# Patient Record
Sex: FEMALE | Race: WHITE | NOT HISPANIC OR LATINO | Employment: OTHER | URBAN - NONMETROPOLITAN AREA
[De-identification: names, ages, dates, MRNs, and addresses within clinical notes are randomized per-mention and may not be internally consistent; named-entity substitution may affect disease eponyms.]

---

## 2017-06-27 ENCOUNTER — OFFICE VISIT (OUTPATIENT)
Dept: OBSTETRICS AND GYNECOLOGY | Facility: CLINIC | Age: 72
End: 2017-06-27

## 2017-06-27 VITALS
HEART RATE: 94 BPM | BODY MASS INDEX: 34.48 KG/M2 | HEIGHT: 62 IN | RESPIRATION RATE: 16 BRPM | SYSTOLIC BLOOD PRESSURE: 120 MMHG | TEMPERATURE: 98.2 F | WEIGHT: 187.4 LBS | DIASTOLIC BLOOD PRESSURE: 80 MMHG

## 2017-06-27 DIAGNOSIS — N30.90 CYSTITIS: Primary | ICD-10-CM

## 2017-06-27 LAB
BACTERIA UR QL AUTO: ABNORMAL /HPF
BILIRUB UR QL STRIP: NEGATIVE
CLARITY UR: ABNORMAL
COLOR UR: YELLOW
GLUCOSE UR STRIP-MCNC: NEGATIVE MG/DL
HGB UR QL STRIP.AUTO: ABNORMAL
HYALINE CASTS UR QL AUTO: ABNORMAL /LPF
KETONES UR QL STRIP: NEGATIVE
LEUKOCYTE ESTERASE UR QL STRIP.AUTO: ABNORMAL
NITRITE UR QL STRIP: POSITIVE
PH UR STRIP.AUTO: 7.5 [PH] (ref 5.5–8)
PROT UR QL STRIP: ABNORMAL
RBC # UR: ABNORMAL /HPF
REF LAB TEST METHOD: ABNORMAL
SP GR UR STRIP: 1.01 (ref 1–1.03)
SQUAMOUS #/AREA URNS HPF: ABNORMAL /HPF
UROBILINOGEN UR QL STRIP: ABNORMAL
WBC CLUMPS # UR AUTO: ABNORMAL /HPF
WBC UR QL AUTO: ABNORMAL /HPF

## 2017-06-27 PROCEDURE — 87077 CULTURE AEROBIC IDENTIFY: CPT | Performed by: NURSE PRACTITIONER

## 2017-06-27 PROCEDURE — 99213 OFFICE O/P EST LOW 20 MIN: CPT | Performed by: NURSE PRACTITIONER

## 2017-06-27 PROCEDURE — 87186 SC STD MICRODIL/AGAR DIL: CPT | Performed by: NURSE PRACTITIONER

## 2017-06-27 PROCEDURE — 87086 URINE CULTURE/COLONY COUNT: CPT | Performed by: NURSE PRACTITIONER

## 2017-06-27 PROCEDURE — 81001 URINALYSIS AUTO W/SCOPE: CPT | Performed by: NURSE PRACTITIONER

## 2017-06-27 RX ORDER — SULFAMETHOXAZOLE AND TRIMETHOPRIM 800; 160 MG/1; MG/1
1 TABLET ORAL 2 TIMES DAILY
Qty: 6 TABLET | Refills: 0 | Status: CANCELLED | OUTPATIENT
Start: 2017-06-27 | End: 2017-06-30

## 2017-06-27 RX ORDER — CIPROFLOXACIN 500 MG/1
500 TABLET, FILM COATED ORAL 2 TIMES DAILY
Qty: 10 TABLET | Refills: 0 | Status: SHIPPED | OUTPATIENT
Start: 2017-06-27 | End: 2017-06-30

## 2017-06-27 NOTE — PROGRESS NOTES
Subjective   Berna Moon is a 71 y.o. female.     History of Present Illness   Pt presents with complains about dysuria and low back pain x 2 weeks. Confirms frequency and urgency, nausea without vomiting, denies fever. Took AZO Last week with some relief of dysuria. Pt hx of bladder sling and pessary. t states she has quit using the pessary because it made her have to pee more often and she noticed more incontinence with placement. She does not wish to retry and says symptoms are tolerable.     The following portions of the patient's history were reviewed and updated as appropriate: allergies, current medications, past family history, past medical history, past social history, past surgical history and problem list.    Review of Systems   Constitutional: Negative.  Negative for chills and fever.   Respiratory: Negative.    Cardiovascular: Negative.    Genitourinary: Positive for dysuria, frequency and urgency. Negative for decreased urine volume, difficulty urinating, flank pain, hematuria, vaginal discharge and vaginal pain.   Musculoskeletal: Positive for back pain (low).   Neurological: Negative for dizziness, syncope and light-headedness.       Objective   Physical Exam   Constitutional: She is oriented to person, place, and time. She appears well-developed and well-nourished.   Cardiovascular: Normal rate, regular rhythm and normal heart sounds.    Pulmonary/Chest: Effort normal and breath sounds normal.   Neurological: She is alert and oriented to person, place, and time.   Psychiatric: She has a normal mood and affect. Her behavior is normal.   Vitals reviewed.    Brief Urine Lab Results  (Last result in the past 365 days)      Color   Clarity   Blood   Leuk Est   Nitrite   Protein   CREAT   Urine HCG        06/27/17 0956 Yellow Turbid(A) Trace(A) Moderate (2+)(A) Positive(A) Trace(A)               Assessment/Plan   Berna was seen today for burning with urination and back pain.    Diagnoses and all  orders for this visit:    Cystitis  -     Urinalysis With / Culture If Indicated  -     Urinalysis, Microscopic Only  -     Urine Culture  -     ciprofloxacin (CIPRO) 500 MG tablet; Take 1 tablet by mouth 2 (Two) Times a Day for 5 days.    Discussed vulvar hygiene and bladder irritants. Pt is consuming lots of acidic foods.  Cipro BID x 5 days. AZO at home prn for bladder spasms. RTC if symptoms persist after antibiotic course. I will call with culture results and change prescription PRN.

## 2017-06-30 LAB — BACTERIA SPEC AEROBE CULT: ABNORMAL

## 2017-06-30 RX ORDER — NITROFURANTOIN 25; 75 MG/1; MG/1
100 CAPSULE ORAL 2 TIMES DAILY
Qty: 10 CAPSULE | Refills: 0 | Status: SHIPPED | OUTPATIENT
Start: 2017-06-30 | End: 2017-07-05

## 2017-07-19 ENCOUNTER — OFFICE VISIT (OUTPATIENT)
Dept: OBSTETRICS AND GYNECOLOGY | Facility: CLINIC | Age: 72
End: 2017-07-19

## 2017-07-19 VITALS
RESPIRATION RATE: 20 BRPM | DIASTOLIC BLOOD PRESSURE: 84 MMHG | BODY MASS INDEX: 34.48 KG/M2 | SYSTOLIC BLOOD PRESSURE: 128 MMHG | TEMPERATURE: 98.1 F | HEIGHT: 62 IN | HEART RATE: 88 BPM | WEIGHT: 187.4 LBS

## 2017-07-19 DIAGNOSIS — I10 ESSENTIAL HYPERTENSION: ICD-10-CM

## 2017-07-19 DIAGNOSIS — Z00.00 ROUTINE GENERAL MEDICAL EXAMINATION AT A HEALTH CARE FACILITY: Primary | ICD-10-CM

## 2017-07-19 DIAGNOSIS — R30.0 DYSURIA: Primary | ICD-10-CM

## 2017-07-19 LAB
BACTERIA UR QL AUTO: ABNORMAL /HPF
BILIRUB UR QL STRIP: NEGATIVE
CLARITY UR: CLEAR
COLOR UR: YELLOW
GLUCOSE UR STRIP-MCNC: NEGATIVE MG/DL
HGB UR QL STRIP.AUTO: ABNORMAL
HYALINE CASTS UR QL AUTO: ABNORMAL /LPF
KETONES UR QL STRIP: NEGATIVE
LEUKOCYTE ESTERASE UR QL STRIP.AUTO: NEGATIVE
NITRITE UR QL STRIP: NEGATIVE
PH UR STRIP.AUTO: 6 [PH] (ref 5.5–8)
PROT UR QL STRIP: NEGATIVE
RBC # UR: ABNORMAL /HPF
REF LAB TEST METHOD: ABNORMAL
SP GR UR STRIP: <=1.005 (ref 1–1.03)
SQUAMOUS #/AREA URNS HPF: ABNORMAL /HPF
UROBILINOGEN UR QL STRIP: ABNORMAL
WBC UR QL AUTO: ABNORMAL /HPF

## 2017-07-19 PROCEDURE — 99213 OFFICE O/P EST LOW 20 MIN: CPT | Performed by: NURSE PRACTITIONER

## 2017-07-19 PROCEDURE — 81001 URINALYSIS AUTO W/SCOPE: CPT | Performed by: NURSE PRACTITIONER

## 2017-07-19 RX ORDER — ESOMEPRAZOLE MAGNESIUM 40 MG/1
40 CAPSULE, DELAYED RELEASE ORAL
COMMUNITY
End: 2017-08-08 | Stop reason: SDUPTHER

## 2017-07-19 NOTE — PROGRESS NOTES
Subjective   Berna Moon is a 71 y.o. female.     History of Present Illness   Pt presents with concerns of mild burning with urination. I treated pt with Macrobid for e.coli on urine culture on 6/30. Pt states her urgency and frequency and diarrhea resolved and dysuria has improved some. Denies Fever, N/V.  She wants to make sure it is not an infection. She continues to consume bladder irritants including tomatoes, fruits, beverages. She notes she has tried to increase water intake.    The following portions of the patient's history were reviewed and updated as appropriate: allergies, current medications, past family history, past medical history, past social history, past surgical history and problem list.    Review of Systems   Constitutional: Negative.  Negative for chills, diaphoresis and fever.   Respiratory: Negative.    Cardiovascular: Negative.    Gastrointestinal: Negative.  Negative for diarrhea, nausea and vomiting.   Genitourinary: Positive for dysuria. Negative for decreased urine volume, difficulty urinating, flank pain, frequency, hematuria, pelvic pain, urgency and vaginal pain.       Objective   Physical Exam   Constitutional: She is oriented to person, place, and time. She appears well-developed and well-nourished.   Neurological: She is alert and oriented to person, place, and time.   Psychiatric: She has a normal mood and affect. Her behavior is normal.   Vitals reviewed.    Brief Urine Lab Results  (Last result in the past 365 days)      Color   Clarity   Blood   Leuk Est   Nitrite   Protein   CREAT   Urine HCG        07/19/17 0943 Yellow Clear Trace(A) Negative Negative Negative               Assessment/Plan   Berna was seen today for burning with urination.    Diagnoses and all orders for this visit:    Dysuria  -     Urinalysis With / Culture If Indicated  -     Urinalysis, Microscopic Only; Future  -     Urinalysis, Microscopic Only       Discussed with pt that UA is negative for  infection. With atrophy likely cause of trace RBC. She denies vaginal symptoms, exam deferred. I provided pt with handout of bladder irritants. Discussed need for 6, 8oz glasses of water daily, avoid all caffeinated beverages, reduce acidic foods like tomatoes, fruits. Sample of AZO x 1 dose provided. Take 1 tablet TID prn for dysuria up to 2 days. Consider vaginal estrogen if dysuria doesn't resolve. RTC if symptoms progress or worsen. Pt is in agreement with plan of care.

## 2017-08-01 ENCOUNTER — LAB (OUTPATIENT)
Dept: LAB | Facility: OTHER | Age: 72
End: 2017-08-01

## 2017-08-01 DIAGNOSIS — I10 ESSENTIAL HYPERTENSION: ICD-10-CM

## 2017-08-01 DIAGNOSIS — Z00.00 ROUTINE GENERAL MEDICAL EXAMINATION AT A HEALTH CARE FACILITY: ICD-10-CM

## 2017-08-01 LAB
ALBUMIN SERPL-MCNC: 3.8 G/DL (ref 3.2–5.5)
ALBUMIN/GLOB SERPL: 1.1 G/DL (ref 1–3)
ALP SERPL-CCNC: 61 U/L (ref 15–121)
ALT SERPL W P-5'-P-CCNC: 35 U/L (ref 10–60)
ANION GAP SERPL CALCULATED.3IONS-SCNC: 11 MMOL/L (ref 5–15)
AST SERPL-CCNC: 30 U/L (ref 10–60)
BASOPHILS # BLD AUTO: 0.03 10*3/MM3 (ref 0–0.2)
BASOPHILS NFR BLD AUTO: 0.3 % (ref 0–2)
BILIRUB SERPL-MCNC: 0.6 MG/DL (ref 0.2–1)
BUN BLD-MCNC: 14 MG/DL (ref 8–25)
BUN/CREAT SERPL: 20 (ref 7–25)
CALCIUM SPEC-SCNC: 9.7 MG/DL (ref 8.4–10.8)
CHLORIDE SERPL-SCNC: 102 MMOL/L (ref 100–112)
CHOLEST SERPL-MCNC: 195 MG/DL (ref 150–200)
CO2 SERPL-SCNC: 28 MMOL/L (ref 20–32)
CREAT BLD-MCNC: 0.7 MG/DL (ref 0.4–1.3)
DEPRECATED RDW RBC AUTO: 46 FL (ref 36.4–46.3)
EOSINOPHIL # BLD AUTO: 0.56 10*3/MM3 (ref 0–0.7)
EOSINOPHIL NFR BLD AUTO: 5.5 % (ref 0–7)
ERYTHROCYTE [DISTWIDTH] IN BLOOD BY AUTOMATED COUNT: 14.1 % (ref 11.5–14.5)
GFR SERPL CREATININE-BSD FRML MDRD: 83 ML/MIN/1.73 (ref 39–90)
GLOBULIN UR ELPH-MCNC: 3.4 GM/DL (ref 2.5–4.6)
GLUCOSE BLD-MCNC: 94 MG/DL (ref 70–100)
HCT VFR BLD AUTO: 40.8 % (ref 35–45)
HDLC SERPL-MCNC: 37 MG/DL (ref 35–100)
HGB BLD-MCNC: 13.4 G/DL (ref 12–15.5)
LDLC SERPL CALC-MCNC: 98 MG/DL
LDLC/HDLC SERPL: 2.64 {RATIO}
LYMPHOCYTES # BLD AUTO: 4.91 10*3/MM3 (ref 0.6–4.2)
LYMPHOCYTES NFR BLD AUTO: 48.6 % (ref 10–50)
MCH RBC QN AUTO: 30 PG (ref 26.5–34)
MCHC RBC AUTO-ENTMCNC: 32.8 G/DL (ref 31.4–36)
MCV RBC AUTO: 91.3 FL (ref 80–98)
MONOCYTES # BLD AUTO: 0.72 10*3/MM3 (ref 0–0.9)
MONOCYTES NFR BLD AUTO: 7.1 % (ref 0–12)
NEUTROPHILS # BLD AUTO: 3.88 10*3/MM3 (ref 2–8.6)
NEUTROPHILS NFR BLD AUTO: 38.5 % (ref 37–80)
PLATELET # BLD AUTO: 242 10*3/MM3 (ref 150–450)
PMV BLD AUTO: 10.4 FL (ref 8–12)
POTASSIUM BLD-SCNC: 4.8 MMOL/L (ref 3.4–5.4)
PROT SERPL-MCNC: 7.2 G/DL (ref 6.7–8.2)
RBC # BLD AUTO: 4.47 10*6/MM3 (ref 3.77–5.16)
SODIUM BLD-SCNC: 141 MMOL/L (ref 134–146)
TRIGL SERPL-MCNC: 302 MG/DL (ref 35–160)
VLDLC SERPL-MCNC: 60.4 MG/DL
WBC NRBC COR # BLD: 10.1 10*3/MM3 (ref 3.2–9.8)

## 2017-08-01 PROCEDURE — 85025 COMPLETE CBC W/AUTO DIFF WBC: CPT | Performed by: FAMILY MEDICINE

## 2017-08-01 PROCEDURE — 84439 ASSAY OF FREE THYROXINE: CPT | Performed by: FAMILY MEDICINE

## 2017-08-01 PROCEDURE — 80061 LIPID PANEL: CPT | Performed by: FAMILY MEDICINE

## 2017-08-01 PROCEDURE — 36415 COLL VENOUS BLD VENIPUNCTURE: CPT | Performed by: FAMILY MEDICINE

## 2017-08-01 PROCEDURE — 84443 ASSAY THYROID STIM HORMONE: CPT | Performed by: FAMILY MEDICINE

## 2017-08-01 PROCEDURE — 80053 COMPREHEN METABOLIC PANEL: CPT | Performed by: FAMILY MEDICINE

## 2017-08-02 LAB
T4 FREE SERPL-MCNC: 1.13 NG/DL (ref 0.78–2.19)
TSH SERPL DL<=0.05 MIU/L-ACNC: 3.24 MIU/ML (ref 0.46–4.68)

## 2017-08-08 ENCOUNTER — OFFICE VISIT (OUTPATIENT)
Dept: FAMILY MEDICINE CLINIC | Facility: CLINIC | Age: 72
End: 2017-08-08

## 2017-08-08 VITALS
BODY MASS INDEX: 34.41 KG/M2 | HEIGHT: 62 IN | SYSTOLIC BLOOD PRESSURE: 120 MMHG | DIASTOLIC BLOOD PRESSURE: 68 MMHG | WEIGHT: 187 LBS

## 2017-08-08 DIAGNOSIS — I10 ESSENTIAL HYPERTENSION: Primary | ICD-10-CM

## 2017-08-08 DIAGNOSIS — E78.1 HYPERTRIGLYCERIDEMIA: ICD-10-CM

## 2017-08-08 PROCEDURE — 99214 OFFICE O/P EST MOD 30 MIN: CPT | Performed by: FAMILY MEDICINE

## 2017-08-08 RX ORDER — LISINOPRIL 20 MG/1
20 TABLET ORAL DAILY
Qty: 90 TABLET | Refills: 1 | Status: SHIPPED | OUTPATIENT
Start: 2017-08-08 | End: 2019-05-06 | Stop reason: SDUPTHER

## 2017-08-08 RX ORDER — ESOMEPRAZOLE MAGNESIUM 40 MG/1
40 CAPSULE, DELAYED RELEASE ORAL
Qty: 90 CAPSULE | Refills: 1 | Status: SHIPPED | OUTPATIENT
Start: 2017-08-08 | End: 2019-05-06 | Stop reason: SINTOL

## 2017-08-08 NOTE — PROGRESS NOTES
Subjective   Berna Moon is a 71 y.o. female who presents to the office for follow-up on hypertension and review of labs.     History of Present Illness   Hypertension   This is a chronic problem. The current episode started more than 1 year ago. The problem has been waxing and waning since onset. Associated symptoms include anxiety, headaches and malaise/fatigue. Pertinent negatives include no blurred vision, chest pain, neck pain, orthopnea, palpitations, peripheral edema, PND, shortness of breath or sweats. There are no associated agents to hypertension. Risk factors for coronary artery disease include dyslipidemia and family history. Past treatments include ace inhibitors.  The current treatment provides moderate improvement. There are no compliance problems.      The following portions of the patient's history were reviewed and updated as appropriate: allergies, current medications, past family history, past medical history, past social history, past surgical history and problem list.    Review of Systems   Constitutional: Positive for fatigue.   HENT: Negative.    Respiratory: Negative.  Negative for shortness of breath.    Cardiovascular: Negative.  Negative for chest pain.   Gastrointestinal: Negative.    Musculoskeletal: Positive for arthralgias. Negative for myalgias.   Skin: Negative.    Allergic/Immunologic: Negative for immunocompromised state.   Neurological: Negative for dizziness, tremors, seizures, syncope, weakness and numbness.   Hematological: Negative.    Psychiatric/Behavioral: Negative for agitation, confusion, dysphoric mood and sleep disturbance. The patient is not nervous/anxious.        Objective   Physical Exam   Constitutional: She is oriented to person, place, and time. She appears well-developed and well-nourished.   HENT:   Head: Normocephalic and atraumatic.   Nose: Nose normal.   Mouth/Throat: Oropharynx is clear and moist.   Eyes: Conjunctivae and EOM are normal. Pupils are  equal, round, and reactive to light.   Neck: Normal range of motion. Neck supple. No JVD present. No tracheal deviation present. No thyromegaly present.   Cardiovascular: Normal rate, regular rhythm, normal heart sounds and intact distal pulses.    No murmur heard.  Pulmonary/Chest: Effort normal and breath sounds normal. She has no wheezes.   Abdominal: Soft. Bowel sounds are normal. She exhibits no distension. There is no tenderness.   Musculoskeletal: Normal range of motion. She exhibits no edema.   Lymphadenopathy:     She has no cervical adenopathy.   Neurological: She is alert and oriented to person, place, and time. Coordination normal.   Skin: Skin is warm and dry. No rash noted.   Psychiatric: She has a normal mood and affect.   Nursing note and vitals reviewed.      Assessment/Plan   Berna was seen today for results and med refill.    Diagnoses and all orders for this visit:    Essential hypertension    Hypertriglyceridemia    Other orders  -     esomeprazole (nexIUM) 40 MG capsule; Take 1 capsule by mouth Every Morning Before Breakfast.  -     lisinopril (PRINIVIL,ZESTRIL) 20 MG tablet; Take 1 tablet by mouth Daily.  Review labs as below.  We'll continue with lisinopril attention to check in 6 months.    Triglycerides remain elevated.  She's not been able to tolerate medications for this.  Discussed specific diet changes and will recheck in 6 months     Labs are reviewed with patient.      Lab on 08/01/2017   Component Date Value Ref Range Status   • Free T4 08/01/2017 1.13  0.78 - 2.19 ng/dL Final   • TSH 08/01/2017 3.240  0.460 - 4.680 mIU/mL Final   • Glucose 08/01/2017 94  70 - 100 mg/dL Final   • BUN 08/01/2017 14  8 - 25 mg/dL Final   • Creatinine 08/01/2017 0.70  0.40 - 1.30 mg/dL Final   • Sodium 08/01/2017 141  134 - 146 mmol/L Final   • Potassium 08/01/2017 4.8  3.4 - 5.4 mmol/L Final   • Chloride 08/01/2017 102  100 - 112 mmol/L Final   • CO2 08/01/2017 28.0  20.0 - 32.0 mmol/L Final   •  Calcium 08/01/2017 9.7  8.4 - 10.8 mg/dL Final   • Total Protein 08/01/2017 7.2  6.7 - 8.2 g/dL Final   • Albumin 08/01/2017 3.80  3.20 - 5.50 g/dL Final   • ALT (SGPT) 08/01/2017 35  10 - 60 U/L Final   • AST (SGOT) 08/01/2017 30  10 - 60 U/L Final   • Alkaline Phosphatase 08/01/2017 61  15 - 121 U/L Final   • Total Bilirubin 08/01/2017 0.6  0.2 - 1.0 mg/dL Final   • eGFR Non African Amer 08/01/2017 83  39 - 90 mL/min/1.73 Final   • Globulin 08/01/2017 3.4  2.5 - 4.6 gm/dL Final   • A/G Ratio 08/01/2017 1.1  1.0 - 3.0 g/dL Final   • BUN/Creatinine Ratio 08/01/2017 20.0  7.0 - 25.0 Final   • Anion Gap 08/01/2017 11.0  5.0 - 15.0 mmol/L Final   • Total Cholesterol 08/01/2017 195  150 - 200 mg/dL Final   • Triglycerides 08/01/2017 302* 35 - 160 mg/dL Final   • HDL Cholesterol 08/01/2017 37  35 - 100 mg/dL Final   • LDL Cholesterol  08/01/2017 98  mg/dL Final   • VLDL Cholesterol 08/01/2017 60.4  mg/dL Final   • LDL/HDL Ratio 08/01/2017 2.64   Final   • WBC 08/01/2017 10.10* 3.20 - 9.80 10*3/mm3 Final   • RBC 08/01/2017 4.47  3.77 - 5.16 10*6/mm3 Final   • Hemoglobin 08/01/2017 13.4  12.0 - 15.5 g/dL Final   • Hematocrit 08/01/2017 40.8  35.0 - 45.0 % Final   • MCV 08/01/2017 91.3  80.0 - 98.0 fL Final   • MCH 08/01/2017 30.0  26.5 - 34.0 pg Final   • MCHC 08/01/2017 32.8  31.4 - 36.0 g/dL Final   • RDW 08/01/2017 14.1  11.5 - 14.5 % Final   • RDW-SD 08/01/2017 46.0  36.4 - 46.3 fl Final   • MPV 08/01/2017 10.4  8.0 - 12.0 fL Final   • Platelets 08/01/2017 242  150 - 450 10*3/mm3 Final   • Neutrophil % 08/01/2017 38.5  37.0 - 80.0 % Final   • Lymphocyte % 08/01/2017 48.6  10.0 - 50.0 % Final   • Monocyte % 08/01/2017 7.1  0.0 - 12.0 % Final   • Eosinophil % 08/01/2017 5.5  0.0 - 7.0 % Final   • Basophil % 08/01/2017 0.3  0.0 - 2.0 % Final   • Neutrophils, Absolute 08/01/2017 3.88  2.00 - 8.60 10*3/mm3 Final   • Lymphocytes, Absolute 08/01/2017 4.91* 0.60 - 4.20 10*3/mm3 Final   • Monocytes, Absolute 08/01/2017 0.72   0.00 - 0.90 10*3/mm3 Final   • Eosinophils, Absolute 08/01/2017 0.56  0.00 - 0.70 10*3/mm3 Final   • Basophils, Absolute 08/01/2017 0.03  0.00 - 0.20 10*3/mm3 Final   Office Visit on 07/19/2017   Component Date Value Ref Range Status   • Color, UA 07/19/2017 Yellow  Yellow, Straw Final   • Appearance, UA 07/19/2017 Clear  Clear Final   • pH, UA 07/19/2017 6.0  5.5 - 8.0 Final   • Specific Gravity, UA 07/19/2017 <=1.005  1.005 - 1.030 Final   • Glucose, UA 07/19/2017 Negative  Negative Final   • Ketones, UA 07/19/2017 Negative  Negative Final   • Bilirubin, UA 07/19/2017 Negative  Negative Final   • Blood, UA 07/19/2017 Trace* Negative Final   • Protein, UA 07/19/2017 Negative  Negative Final   • Leuk Esterase, UA 07/19/2017 Negative  Negative Final   • Nitrite, UA 07/19/2017 Negative  Negative Final   • Urobilinogen, UA 07/19/2017 0.2 E.U./dL  0.2 - 1.0 E.U./dL Final   • RBC, UA 07/19/2017 0-2* None Seen /HPF Final   • WBC, UA 07/19/2017 0-2* None Seen /HPF Final   • Bacteria, UA 07/19/2017 Trace* None Seen /HPF Final   • Squamous Epithelial Cells, UA 07/19/2017 0-2  None Seen, 0-2 /HPF Final   • Hyaline Casts, UA 07/19/2017 None Seen  None Seen /LPF Final   • Methodology 07/19/2017 Manual Light Microscopy   Final   Office Visit on 06/27/2017   Component Date Value Ref Range Status   • Color, UA 06/27/2017 Yellow  Yellow, Straw Final   • Appearance, UA 06/27/2017 Turbid* Clear Final   • pH, UA 06/27/2017 7.5  5.5 - 8.0 Final   • Specific Gravity, UA 06/27/2017 1.015  1.005 - 1.030 Final   • Glucose, UA 06/27/2017 Negative  Negative Final   • Ketones, UA 06/27/2017 Negative  Negative Final   • Bilirubin, UA 06/27/2017 Negative  Negative Final   • Blood, UA 06/27/2017 Trace* Negative Final   • Protein, UA 06/27/2017 Trace* Negative Final   • Leuk Esterase, UA 06/27/2017 Moderate (2+)* Negative Final   • Nitrite, UA 06/27/2017 Positive* Negative Final   • Urobilinogen, UA 06/27/2017 0.2 E.U./dL  0.2 - 1.0 E.U./dL Final    • RBC, UA 06/27/2017 3-5* None Seen /HPF Final   • WBC, UA 06/27/2017 Too Numerous to Count* None Seen /HPF Final   • Bacteria, UA 06/27/2017 3+* None Seen /HPF Final   • Squamous Epithelial Cells, UA 06/27/2017 3-6* None Seen, 0-2 /HPF Final   • Hyaline Casts, UA 06/27/2017 None Seen  None Seen /LPF Final   • WBC Clumps, UA 06/27/2017 Moderate/2+  None Seen /HPF Final   • Methodology 06/27/2017 Manual Light Microscopy   Final   • Urine Culture 06/27/2017 >100,000 CFU/mL Escherichia coli*  Final   ]

## 2018-10-18 ENCOUNTER — CLINICAL SUPPORT (OUTPATIENT)
Dept: FAMILY MEDICINE CLINIC | Facility: CLINIC | Age: 73
End: 2018-10-18

## 2018-10-18 DIAGNOSIS — Z23 NEED FOR VACCINATION: Primary | ICD-10-CM

## 2018-10-18 PROCEDURE — 90662 IIV NO PRSV INCREASED AG IM: CPT | Performed by: FAMILY MEDICINE

## 2018-10-18 PROCEDURE — G0008 ADMIN INFLUENZA VIRUS VAC: HCPCS | Performed by: FAMILY MEDICINE

## 2019-04-29 DIAGNOSIS — E78.1 HYPERTRIGLYCERIDEMIA: ICD-10-CM

## 2019-04-29 DIAGNOSIS — I10 ESSENTIAL HYPERTENSION: Primary | ICD-10-CM

## 2019-04-30 ENCOUNTER — LAB (OUTPATIENT)
Dept: LAB | Facility: OTHER | Age: 74
End: 2019-04-30

## 2019-04-30 DIAGNOSIS — I10 ESSENTIAL HYPERTENSION: ICD-10-CM

## 2019-04-30 DIAGNOSIS — E78.1 HYPERTRIGLYCERIDEMIA: ICD-10-CM

## 2019-04-30 LAB
ALBUMIN SERPL-MCNC: 4.1 G/DL (ref 3.5–5)
ALBUMIN/GLOB SERPL: 1.2 G/DL (ref 1.1–1.8)
ALP SERPL-CCNC: 69 U/L (ref 38–126)
ALT SERPL W P-5'-P-CCNC: 38 U/L
ANION GAP SERPL CALCULATED.3IONS-SCNC: 8 MMOL/L (ref 5–15)
AST SERPL-CCNC: 38 U/L (ref 14–36)
BASOPHILS # BLD AUTO: 0.04 10*3/MM3 (ref 0–0.2)
BASOPHILS NFR BLD AUTO: 0.4 % (ref 0–1.5)
BILIRUB SERPL-MCNC: 0.4 MG/DL (ref 0.2–1.3)
BUN BLD-MCNC: 11 MG/DL (ref 7–23)
BUN/CREAT SERPL: 16.4 (ref 7–25)
CALCIUM SPEC-SCNC: 9.8 MG/DL (ref 8.4–10.2)
CHLORIDE SERPL-SCNC: 102 MMOL/L (ref 101–112)
CHOLEST SERPL-MCNC: 207 MG/DL (ref 150–200)
CO2 SERPL-SCNC: 31 MMOL/L (ref 22–30)
CREAT BLD-MCNC: 0.67 MG/DL (ref 0.52–1.04)
DEPRECATED RDW RBC AUTO: 47 FL (ref 37–54)
EOSINOPHIL # BLD AUTO: 0.4 10*3/MM3 (ref 0–0.4)
EOSINOPHIL NFR BLD AUTO: 4.5 % (ref 0.3–6.2)
ERYTHROCYTE [DISTWIDTH] IN BLOOD BY AUTOMATED COUNT: 14.3 % (ref 12.3–15.4)
GFR SERPL CREATININE-BSD FRML MDRD: 87 ML/MIN/1.73 (ref 39–90)
GLOBULIN UR ELPH-MCNC: 3.5 GM/DL (ref 2.3–3.5)
GLUCOSE BLD-MCNC: 96 MG/DL (ref 70–99)
HCT VFR BLD AUTO: 41.7 % (ref 34–46.6)
HDLC SERPL-MCNC: 42 MG/DL (ref 40–59)
HGB BLD-MCNC: 13.8 G/DL (ref 12–15.9)
LDLC SERPL CALC-MCNC: 124 MG/DL
LDLC/HDLC SERPL: 2.96 {RATIO} (ref 0–3.22)
LYMPHOCYTES # BLD AUTO: 3.88 10*3/MM3 (ref 0.7–3.1)
LYMPHOCYTES NFR BLD AUTO: 43.4 % (ref 19.6–45.3)
MCH RBC QN AUTO: 30.5 PG (ref 26.6–33)
MCHC RBC AUTO-ENTMCNC: 33.1 G/DL (ref 31.5–35.7)
MCV RBC AUTO: 92.3 FL (ref 79–97)
MONOCYTES # BLD AUTO: 0.77 10*3/MM3 (ref 0.1–0.9)
MONOCYTES NFR BLD AUTO: 8.6 % (ref 5–12)
NEUTROPHILS # BLD AUTO: 3.86 10*3/MM3 (ref 1.7–7)
NEUTROPHILS NFR BLD AUTO: 43.1 % (ref 42.7–76)
PLATELET # BLD AUTO: 242 10*3/MM3 (ref 140–450)
PMV BLD AUTO: 10.3 FL (ref 6–12)
POTASSIUM BLD-SCNC: 4.6 MMOL/L (ref 3.4–5)
PROT SERPL-MCNC: 7.6 G/DL (ref 6.3–8.6)
RBC # BLD AUTO: 4.52 10*6/MM3 (ref 3.77–5.28)
SODIUM BLD-SCNC: 141 MMOL/L (ref 137–145)
T4 FREE SERPL-MCNC: 1.12 NG/DL (ref 0.93–1.7)
TRIGL SERPL-MCNC: 204 MG/DL
TSH SERPL DL<=0.05 MIU/L-ACNC: 3.22 MIU/ML (ref 0.27–4.2)
VLDLC SERPL-MCNC: 40.8 MG/DL
WBC NRBC COR # BLD: 8.95 10*3/MM3 (ref 3.4–10.8)

## 2019-04-30 PROCEDURE — 85025 COMPLETE CBC W/AUTO DIFF WBC: CPT | Performed by: FAMILY MEDICINE

## 2019-04-30 PROCEDURE — 84439 ASSAY OF FREE THYROXINE: CPT | Performed by: FAMILY MEDICINE

## 2019-04-30 PROCEDURE — 84443 ASSAY THYROID STIM HORMONE: CPT | Performed by: FAMILY MEDICINE

## 2019-04-30 PROCEDURE — 80061 LIPID PANEL: CPT | Performed by: FAMILY MEDICINE

## 2019-04-30 PROCEDURE — 36415 COLL VENOUS BLD VENIPUNCTURE: CPT | Performed by: FAMILY MEDICINE

## 2019-04-30 PROCEDURE — 80053 COMPREHEN METABOLIC PANEL: CPT | Performed by: FAMILY MEDICINE

## 2019-05-06 ENCOUNTER — OFFICE VISIT (OUTPATIENT)
Dept: FAMILY MEDICINE CLINIC | Facility: CLINIC | Age: 74
End: 2019-05-06

## 2019-05-06 VITALS
SYSTOLIC BLOOD PRESSURE: 124 MMHG | DIASTOLIC BLOOD PRESSURE: 88 MMHG | WEIGHT: 190 LBS | BODY MASS INDEX: 37.3 KG/M2 | HEIGHT: 60 IN

## 2019-05-06 DIAGNOSIS — E66.9 OBESITY (BMI 30-39.9): ICD-10-CM

## 2019-05-06 DIAGNOSIS — I10 ESSENTIAL HYPERTENSION: Primary | ICD-10-CM

## 2019-05-06 DIAGNOSIS — E78.5 HYPERLIPIDEMIA, UNSPECIFIED HYPERLIPIDEMIA TYPE: ICD-10-CM

## 2019-05-06 PROCEDURE — 99214 OFFICE O/P EST MOD 30 MIN: CPT | Performed by: FAMILY MEDICINE

## 2019-05-06 RX ORDER — LISINOPRIL 20 MG/1
20 TABLET ORAL DAILY
Qty: 90 TABLET | Refills: 1 | Status: SHIPPED | OUTPATIENT
Start: 2019-05-06 | End: 2019-10-31 | Stop reason: SDUPTHER

## 2019-05-06 NOTE — PROGRESS NOTES
Subjective   Berna Moon is a 73 y.o. female who presents to the office for follow-up on hypertension and review of labs.  She has gained some weight and is asking for some tips to help her lose.  She admits that she eats a lot of ice cream and sweets.  Reviewed her labs today.  She has tried all of the statins even at a low dose and had severe intolerable muscle aches.    History of Present Illness   Hypertension   This is a chronic problem. The current episode started more than 1 year ago. The problem has been waxing and waning since onset. Associated symptoms include anxiety, headaches and malaise/fatigue. Pertinent negatives include no blurred vision, chest pain, neck pain, orthopnea, palpitations, peripheral edema, PND, shortness of breath or sweats. There are no associated agents to hypertension. Risk factors for coronary artery disease include dyslipidemia and family history. Past treatments include ace inhibitors.  The current treatment provides moderate improvement. There are no compliance problems.      The following portions of the patient's history were reviewed and updated as appropriate: allergies, current medications, past family history, past medical history, past social history, past surgical history and problem list.    Review of Systems   Constitutional: Positive for fatigue.   HENT: Negative.    Respiratory: Negative.  Negative for shortness of breath.    Cardiovascular: Negative.  Negative for chest pain.   Gastrointestinal: Negative.    Musculoskeletal: Positive for arthralgias. Negative for myalgias.   Skin: Negative.    Allergic/Immunologic: Negative for immunocompromised state.   Neurological: Negative for dizziness, tremors, seizures, syncope, weakness and numbness.   Hematological: Negative.    Psychiatric/Behavioral: Negative for agitation, confusion, dysphoric mood and sleep disturbance. The patient is not nervous/anxious.        Objective   Physical Exam   Constitutional: She is  oriented to person, place, and time. She appears well-developed and well-nourished.   HENT:   Head: Normocephalic and atraumatic.   Nose: Nose normal.   Mouth/Throat: Oropharynx is clear and moist.   Eyes: Conjunctivae and EOM are normal. Pupils are equal, round, and reactive to light.   Neck: Normal range of motion. Neck supple. No JVD present. No tracheal deviation present. No thyromegaly present.   Cardiovascular: Normal rate, regular rhythm, normal heart sounds and intact distal pulses.   No murmur heard.  Pulmonary/Chest: Effort normal and breath sounds normal. She has no wheezes.   Abdominal: Soft. Bowel sounds are normal. She exhibits no distension. There is no tenderness.   Musculoskeletal: Normal range of motion. She exhibits no edema.   Lymphadenopathy:     She has no cervical adenopathy.   Neurological: She is alert and oriented to person, place, and time. Coordination normal.   Skin: Skin is warm and dry. No rash noted.   Psychiatric: She has a normal mood and affect.   Nursing note and vitals reviewed.      Assessment/Plan   Berna was seen today for follow-up.    Diagnoses and all orders for this visit:    Essential hypertension    Hyperlipidemia, unspecified hyperlipidemia type    Obesity (BMI 30-39.9)    Other orders  -     lisinopril (PRINIVIL,ZESTRIL) 20 MG tablet; Take 1 tablet by mouth Daily.    Review labs as below.  We'll continue with lisinopril attention to check in 6 months.  Gave her a printout on some diet changes specifically for low-cholesterol diet.  With weight loss and dietary compliance hopefully we can bring her cholesterol down as she does not tolerate the statins but will follow-up in 6 months and if not improving consider other treatment forms.     Labs are reviewed with patient.      Lab on 04/30/2019   Component Date Value Ref Range Status   • Free T4 04/30/2019 1.12  0.93 - 1.70 ng/dL Final   • TSH 04/30/2019 3.220  0.270 - 4.200 mIU/mL Final   • Glucose 04/30/2019 96  70 - 99  mg/dL Final   • BUN 04/30/2019 11  7 - 23 mg/dL Final   • Creatinine 04/30/2019 0.67  0.52 - 1.04 mg/dL Final   • Sodium 04/30/2019 141  137 - 145 mmol/L Final   • Potassium 04/30/2019 4.6  3.4 - 5.0 mmol/L Final   • Chloride 04/30/2019 102  101 - 112 mmol/L Final   • CO2 04/30/2019 31.0* 22.0 - 30.0 mmol/L Final   • Calcium 04/30/2019 9.8  8.4 - 10.2 mg/dL Final   • Total Protein 04/30/2019 7.6  6.3 - 8.6 g/dL Final   • Albumin 04/30/2019 4.10  3.50 - 5.00 g/dL Final   • ALT (SGPT) 04/30/2019 38* <=35 U/L Final   • AST (SGOT) 04/30/2019 38* 14 - 36 U/L Final   • Alkaline Phosphatase 04/30/2019 69  38 - 126 U/L Final   • Total Bilirubin 04/30/2019 0.4  0.2 - 1.3 mg/dL Final   • eGFR Non African Amer 04/30/2019 87  39 - 90 mL/min/1.73 Final   • Globulin 04/30/2019 3.5  2.3 - 3.5 gm/dL Final   • A/G Ratio 04/30/2019 1.2  1.1 - 1.8 g/dL Final   • BUN/Creatinine Ratio 04/30/2019 16.4  7.0 - 25.0 Final   • Anion Gap 04/30/2019 8.0  5.0 - 15.0 mmol/L Final   • Total Cholesterol 04/30/2019 207* 150 - 200 mg/dL Final   • Triglycerides 04/30/2019 204* <=150 mg/dL Final   • HDL Cholesterol 04/30/2019 42  40 - 59 mg/dL Final   • LDL Cholesterol  04/30/2019 124* <=100 mg/dL Final   • VLDL Cholesterol 04/30/2019 40.8  mg/dL Final   • LDL/HDL Ratio 04/30/2019 2.96  0.00 - 3.22 Final   • WBC 04/30/2019 8.95  3.40 - 10.80 10*3/mm3 Final   • RBC 04/30/2019 4.52  3.77 - 5.28 10*6/mm3 Final   • Hemoglobin 04/30/2019 13.8  12.0 - 15.9 g/dL Final   • Hematocrit 04/30/2019 41.7  34.0 - 46.6 % Final   • MCV 04/30/2019 92.3  79.0 - 97.0 fL Final   • MCH 04/30/2019 30.5  26.6 - 33.0 pg Final   • MCHC 04/30/2019 33.1  31.5 - 35.7 g/dL Final   • RDW 04/30/2019 14.3  12.3 - 15.4 % Final   • RDW-SD 04/30/2019 47.0  37.0 - 54.0 fl Final   • MPV 04/30/2019 10.3  6.0 - 12.0 fL Final   • Platelets 04/30/2019 242  140 - 450 10*3/mm3 Final   • Neutrophil % 04/30/2019 43.1  42.7 - 76.0 % Final   • Lymphocyte % 04/30/2019 43.4  19.6 - 45.3 % Final   •  Monocyte % 04/30/2019 8.6  5.0 - 12.0 % Final   • Eosinophil % 04/30/2019 4.5  0.3 - 6.2 % Final   • Basophil % 04/30/2019 0.4  0.0 - 1.5 % Final   • Neutrophils, Absolute 04/30/2019 3.86  1.70 - 7.00 10*3/mm3 Final   • Lymphocytes, Absolute 04/30/2019 3.88* 0.70 - 3.10 10*3/mm3 Final   • Monocytes, Absolute 04/30/2019 0.77  0.10 - 0.90 10*3/mm3 Final   • Eosinophils, Absolute 04/30/2019 0.40  0.00 - 0.40 10*3/mm3 Final   • Basophils, Absolute 04/30/2019 0.04  0.00 - 0.20 10*3/mm3 Final   ]

## 2019-10-11 ENCOUNTER — CLINICAL SUPPORT (OUTPATIENT)
Dept: FAMILY MEDICINE CLINIC | Facility: CLINIC | Age: 74
End: 2019-10-11

## 2019-10-11 DIAGNOSIS — Z23 NEED FOR INFLUENZA VACCINATION: Primary | ICD-10-CM

## 2019-10-11 PROCEDURE — 90653 IIV ADJUVANT VACCINE IM: CPT | Performed by: INTERNAL MEDICINE

## 2019-10-11 PROCEDURE — 90471 IMMUNIZATION ADMIN: CPT | Performed by: INTERNAL MEDICINE

## 2019-10-29 ENCOUNTER — LAB (OUTPATIENT)
Dept: LAB | Facility: OTHER | Age: 74
End: 2019-10-29

## 2019-10-29 DIAGNOSIS — E78.5 HYPERLIPIDEMIA, UNSPECIFIED HYPERLIPIDEMIA TYPE: ICD-10-CM

## 2019-10-29 DIAGNOSIS — I10 ESSENTIAL HYPERTENSION: ICD-10-CM

## 2019-10-29 DIAGNOSIS — I10 ESSENTIAL HYPERTENSION: Primary | ICD-10-CM

## 2019-10-29 LAB
ALBUMIN SERPL-MCNC: 4.4 G/DL (ref 3.5–5)
ALBUMIN/GLOB SERPL: 1.3 G/DL (ref 1.1–1.8)
ALP SERPL-CCNC: 70 U/L (ref 38–126)
ALT SERPL W P-5'-P-CCNC: 40 U/L
ANION GAP SERPL CALCULATED.3IONS-SCNC: 10 MMOL/L (ref 5–15)
AST SERPL-CCNC: 35 U/L (ref 14–36)
BASOPHILS # BLD AUTO: 0.02 10*3/MM3 (ref 0–0.2)
BASOPHILS NFR BLD AUTO: 0.2 % (ref 0–1.5)
BILIRUB SERPL-MCNC: 0.5 MG/DL (ref 0.2–1.3)
BUN BLD-MCNC: 11 MG/DL (ref 7–23)
BUN/CREAT SERPL: 15.7 (ref 7–25)
CALCIUM SPEC-SCNC: 9.8 MG/DL (ref 8.4–10.2)
CHLORIDE SERPL-SCNC: 103 MMOL/L (ref 101–112)
CHOLEST SERPL-MCNC: 191 MG/DL (ref 150–200)
CO2 SERPL-SCNC: 30 MMOL/L (ref 22–30)
CREAT BLD-MCNC: 0.7 MG/DL (ref 0.52–1.04)
DEPRECATED RDW RBC AUTO: 45.4 FL (ref 37–54)
EOSINOPHIL # BLD AUTO: 0.38 10*3/MM3 (ref 0–0.4)
EOSINOPHIL NFR BLD AUTO: 4.4 % (ref 0.3–6.2)
ERYTHROCYTE [DISTWIDTH] IN BLOOD BY AUTOMATED COUNT: 14 % (ref 12.3–15.4)
GFR SERPL CREATININE-BSD FRML MDRD: 82 ML/MIN/1.73 (ref 39–90)
GLOBULIN UR ELPH-MCNC: 3.3 GM/DL (ref 2.3–3.5)
GLUCOSE BLD-MCNC: 100 MG/DL (ref 70–99)
HCT VFR BLD AUTO: 42.8 % (ref 34–46.6)
HDLC SERPL-MCNC: 39 MG/DL (ref 40–59)
HGB BLD-MCNC: 14.1 G/DL (ref 12–15.9)
LDLC SERPL CALC-MCNC: 106 MG/DL
LDLC/HDLC SERPL: 2.72 {RATIO} (ref 0–3.22)
LYMPHOCYTES # BLD AUTO: 3.39 10*3/MM3 (ref 0.7–3.1)
LYMPHOCYTES NFR BLD AUTO: 39.6 % (ref 19.6–45.3)
MCH RBC QN AUTO: 29.8 PG (ref 26.6–33)
MCHC RBC AUTO-ENTMCNC: 32.9 G/DL (ref 31.5–35.7)
MCV RBC AUTO: 90.5 FL (ref 79–97)
MONOCYTES # BLD AUTO: 0.63 10*3/MM3 (ref 0.1–0.9)
MONOCYTES NFR BLD AUTO: 7.4 % (ref 5–12)
NEUTROPHILS # BLD AUTO: 4.14 10*3/MM3 (ref 1.7–7)
NEUTROPHILS NFR BLD AUTO: 48.4 % (ref 42.7–76)
PLATELET # BLD AUTO: 258 10*3/MM3 (ref 140–450)
PMV BLD AUTO: 10.5 FL (ref 6–12)
POTASSIUM BLD-SCNC: 4.4 MMOL/L (ref 3.4–5)
PROT SERPL-MCNC: 7.7 G/DL (ref 6.3–8.6)
RBC # BLD AUTO: 4.73 10*6/MM3 (ref 3.77–5.28)
SODIUM BLD-SCNC: 143 MMOL/L (ref 137–145)
TRIGL SERPL-MCNC: 229 MG/DL
VLDLC SERPL-MCNC: 45.8 MG/DL
WBC NRBC COR # BLD: 8.56 10*3/MM3 (ref 3.4–10.8)

## 2019-10-29 PROCEDURE — 36415 COLL VENOUS BLD VENIPUNCTURE: CPT | Performed by: FAMILY MEDICINE

## 2019-10-29 PROCEDURE — 80061 LIPID PANEL: CPT | Performed by: FAMILY MEDICINE

## 2019-10-29 PROCEDURE — 85025 COMPLETE CBC W/AUTO DIFF WBC: CPT | Performed by: FAMILY MEDICINE

## 2019-10-29 PROCEDURE — 80053 COMPREHEN METABOLIC PANEL: CPT | Performed by: FAMILY MEDICINE

## 2019-10-29 PROCEDURE — 84443 ASSAY THYROID STIM HORMONE: CPT | Performed by: FAMILY MEDICINE

## 2019-10-29 PROCEDURE — 84439 ASSAY OF FREE THYROXINE: CPT | Performed by: FAMILY MEDICINE

## 2019-10-30 LAB
T4 FREE SERPL-MCNC: 1.16 NG/DL (ref 0.93–1.7)
TSH SERPL DL<=0.05 MIU/L-ACNC: 3.42 UIU/ML (ref 0.27–4.2)

## 2019-11-01 RX ORDER — LISINOPRIL 20 MG/1
TABLET ORAL
Qty: 90 TABLET | Refills: 1 | Status: SHIPPED | OUTPATIENT
Start: 2019-11-01 | End: 2019-11-06 | Stop reason: SDUPTHER

## 2019-11-06 ENCOUNTER — OFFICE VISIT (OUTPATIENT)
Dept: FAMILY MEDICINE CLINIC | Facility: CLINIC | Age: 74
End: 2019-11-06

## 2019-11-06 VITALS
SYSTOLIC BLOOD PRESSURE: 110 MMHG | DIASTOLIC BLOOD PRESSURE: 76 MMHG | HEIGHT: 60 IN | BODY MASS INDEX: 37.69 KG/M2 | WEIGHT: 192 LBS

## 2019-11-06 DIAGNOSIS — Z12.31 ENCOUNTER FOR SCREENING MAMMOGRAM FOR BREAST CANCER: Primary | ICD-10-CM

## 2019-11-06 DIAGNOSIS — E78.5 HYPERLIPIDEMIA, UNSPECIFIED HYPERLIPIDEMIA TYPE: ICD-10-CM

## 2019-11-06 DIAGNOSIS — Z78.0 POST-MENOPAUSAL: ICD-10-CM

## 2019-11-06 DIAGNOSIS — J30.2 SEASONAL ALLERGIES: ICD-10-CM

## 2019-11-06 DIAGNOSIS — E66.01 MORBIDLY OBESE (HCC): ICD-10-CM

## 2019-11-06 PROCEDURE — 99214 OFFICE O/P EST MOD 30 MIN: CPT | Performed by: FAMILY MEDICINE

## 2019-11-06 RX ORDER — LORATADINE 10 MG/1
10 TABLET ORAL DAILY
Qty: 30 TABLET | Refills: 5 | Status: SHIPPED | OUTPATIENT
Start: 2019-11-06 | End: 2021-07-20

## 2019-11-06 RX ORDER — LISINOPRIL 20 MG/1
20 TABLET ORAL DAILY
Qty: 90 TABLET | Refills: 1 | Status: SHIPPED | OUTPATIENT
Start: 2019-11-06 | End: 2020-06-05

## 2019-11-06 NOTE — PROGRESS NOTES
"  Subjective   Berna Moon is a 74 y.o. female with hypertension, hyperlipidemia who presents to the office for follow-up and review of labs.    She is due for mammogram and bone density.  Blood pressure has been fairly well controlled on her current medicine.    She feels like she has \"in her ear.\"  She is having some head fullness and congestion.  No fever or rhinorrhea.    History of Present Illness   Hypertension   This is a chronic problem. The current episode started more than 1 year ago. The problem has been waxing and waning since onset. Associated symptoms include anxiety, headaches and malaise/fatigue. Pertinent negatives include no blurred vision, chest pain, neck pain, orthopnea, palpitations, peripheral edema, PND, shortness of breath or sweats. There are no associated agents to hypertension. Risk factors for coronary artery disease include dyslipidemia and family history. Past treatments include ace inhibitors.  The current treatment provides moderate improvement. There are no compliance problems.      The following portions of the patient's history were reviewed and updated as appropriate: allergies, current medications, past family history, past medical history, past social history, past surgical history and problem list.    Review of Systems   Constitutional: Positive for fatigue.   HENT: Negative.    Respiratory: Negative.  Negative for shortness of breath.    Cardiovascular: Negative.  Negative for chest pain.   Gastrointestinal: Negative.    Musculoskeletal: Positive for arthralgias. Negative for myalgias.   Skin: Negative.    Allergic/Immunologic: Negative for immunocompromised state.   Neurological: Negative for dizziness, tremors, seizures, syncope, weakness and numbness.   Hematological: Negative.    Psychiatric/Behavioral: Negative for agitation, confusion, dysphoric mood and sleep disturbance. The patient is not nervous/anxious.        Objective      Vitals:    11/06/19 1007   BP: " "110/76   Weight: 87.1 kg (192 lb)   Height: 152.4 cm (60\")   PainSc: 0-No pain     BMI 37.5    Physical Exam   Constitutional: She is oriented to person, place, and time. She appears well-developed and well-nourished.   HENT:   Head: Normocephalic and atraumatic.   Nose: Nose normal.   Mouth/Throat: Oropharynx is clear and moist.   Eyes: Conjunctivae and EOM are normal. Pupils are equal, round, and reactive to light.   Neck: Normal range of motion. Neck supple. No JVD present. No tracheal deviation present. No thyromegaly present.   Cardiovascular: Normal rate, regular rhythm, normal heart sounds and intact distal pulses.   No murmur heard.  Pulmonary/Chest: Effort normal and breath sounds normal. She has no wheezes.   Abdominal: Soft. Bowel sounds are normal. She exhibits no distension. There is no tenderness.   Musculoskeletal: Normal range of motion. She exhibits no edema.   Lymphadenopathy:     She has no cervical adenopathy.   Neurological: She is alert and oriented to person, place, and time. Coordination normal.   Skin: Skin is warm and dry. No rash noted.   Psychiatric: She has a normal mood and affect.   Nursing note and vitals reviewed.    Lab on 10/29/2019   Component Date Value Ref Range Status   • Free T4 10/29/2019 1.16  0.93 - 1.70 ng/dL Final   • TSH 10/29/2019 3.420  0.270 - 4.200 uIU/mL Final   • Glucose 10/29/2019 100* 70 - 99 mg/dL Final   • BUN 10/29/2019 11  7 - 23 mg/dL Final   • Creatinine 10/29/2019 0.70  0.52 - 1.04 mg/dL Final   • Sodium 10/29/2019 143  137 - 145 mmol/L Final   • Potassium 10/29/2019 4.4  3.4 - 5.0 mmol/L Final   • Chloride 10/29/2019 103  101 - 112 mmol/L Final   • CO2 10/29/2019 30.0  22.0 - 30.0 mmol/L Final   • Calcium 10/29/2019 9.8  8.4 - 10.2 mg/dL Final   • Total Protein 10/29/2019 7.7  6.3 - 8.6 g/dL Final   • Albumin 10/29/2019 4.40  3.50 - 5.00 g/dL Final   • ALT (SGPT) 10/29/2019 40* <=35 U/L Final   • AST (SGOT) 10/29/2019 35  14 - 36 U/L Final   • Alkaline " Phosphatase 10/29/2019 70  38 - 126 U/L Final   • Total Bilirubin 10/29/2019 0.5  0.2 - 1.3 mg/dL Final   • eGFR Non African Amer 10/29/2019 82  39 - 90 mL/min/1.73 Final   • Globulin 10/29/2019 3.3  2.3 - 3.5 gm/dL Final   • A/G Ratio 10/29/2019 1.3  1.1 - 1.8 g/dL Final   • BUN/Creatinine Ratio 10/29/2019 15.7  7.0 - 25.0 Final   • Anion Gap 10/29/2019 10.0  5.0 - 15.0 mmol/L Final   • Total Cholesterol 10/29/2019 191  150 - 200 mg/dL Final   • Triglycerides 10/29/2019 229* <=150 mg/dL Final   • HDL Cholesterol 10/29/2019 39* 40 - 59 mg/dL Final   • LDL Cholesterol  10/29/2019 106* <=100 mg/dL Final   • VLDL Cholesterol 10/29/2019 45.8  mg/dL Final   • LDL/HDL Ratio 10/29/2019 2.72  0.00 - 3.22 Final   • WBC 10/29/2019 8.56  3.40 - 10.80 10*3/mm3 Final   • RBC 10/29/2019 4.73  3.77 - 5.28 10*6/mm3 Final   • Hemoglobin 10/29/2019 14.1  12.0 - 15.9 g/dL Final   • Hematocrit 10/29/2019 42.8  34.0 - 46.6 % Final   • MCV 10/29/2019 90.5  79.0 - 97.0 fL Final   • MCH 10/29/2019 29.8  26.6 - 33.0 pg Final   • MCHC 10/29/2019 32.9  31.5 - 35.7 g/dL Final   • RDW 10/29/2019 14.0  12.3 - 15.4 % Final   • RDW-SD 10/29/2019 45.4  37.0 - 54.0 fl Final   • MPV 10/29/2019 10.5  6.0 - 12.0 fL Final   • Platelets 10/29/2019 258  140 - 450 10*3/mm3 Final   • Neutrophil % 10/29/2019 48.4  42.7 - 76.0 % Final   • Lymphocyte % 10/29/2019 39.6  19.6 - 45.3 % Final   • Monocyte % 10/29/2019 7.4  5.0 - 12.0 % Final   • Eosinophil % 10/29/2019 4.4  0.3 - 6.2 % Final   • Basophil % 10/29/2019 0.2  0.0 - 1.5 % Final   • Neutrophils, Absolute 10/29/2019 4.14  1.70 - 7.00 10*3/mm3 Final   • Lymphocytes, Absolute 10/29/2019 3.39* 0.70 - 3.10 10*3/mm3 Final   • Monocytes, Absolute 10/29/2019 0.63  0.10 - 0.90 10*3/mm3 Final   • Eosinophils, Absolute 10/29/2019 0.38  0.00 - 0.40 10*3/mm3 Final   • Basophils, Absolute 10/29/2019 0.02  0.00 - 0.20 10*3/mm3 Final     Assessment/Plan   Berna was seen today for follow-up.    Diagnoses and all orders  for this visit:    Encounter for screening mammogram for breast cancer  -     Mammo Screening Bilateral With CAD; Future    Morbidly obese (CMS/HCC)    Post-menopausal  -     DEXA Bone Density Axial; Future    Hyperlipidemia, unspecified hyperlipidemia type    Seasonal allergies    Other orders  -     lisinopril (PRINIVIL,ZESTRIL) 20 MG tablet; Take 1 tablet by mouth Daily.  -     loratadine (CLARITIN) 10 MG tablet; Take 1 tablet by mouth Daily.    Review labs as below.  We'll continue with lisinopril for hypertension.  She has not been able to tolerate the statins.  Discussed diet changes, weight loss to help with lipids and recheck in 6 months.      Suspect seasonal allergies are triggering some of the ear fullness.  Recommend Claritin, Flonase and see me if not improving within 2 weeks or for worsening symptoms.    Labs are reviewed with patient.    Discussed the patient's BMI with her. BMI is above normal parameters. Follow-up plan includes:  educational material and nutrition counseling.  Needs mammogram, bone density    Lab on 10/29/2019   Component Date Value Ref Range Status   • Free T4 10/29/2019 1.16  0.93 - 1.70 ng/dL Final   • TSH 10/29/2019 3.420  0.270 - 4.200 uIU/mL Final   • Glucose 10/29/2019 100* 70 - 99 mg/dL Final   • BUN 10/29/2019 11  7 - 23 mg/dL Final   • Creatinine 10/29/2019 0.70  0.52 - 1.04 mg/dL Final   • Sodium 10/29/2019 143  137 - 145 mmol/L Final   • Potassium 10/29/2019 4.4  3.4 - 5.0 mmol/L Final   • Chloride 10/29/2019 103  101 - 112 mmol/L Final   • CO2 10/29/2019 30.0  22.0 - 30.0 mmol/L Final   • Calcium 10/29/2019 9.8  8.4 - 10.2 mg/dL Final   • Total Protein 10/29/2019 7.7  6.3 - 8.6 g/dL Final   • Albumin 10/29/2019 4.40  3.50 - 5.00 g/dL Final   • ALT (SGPT) 10/29/2019 40* <=35 U/L Final   • AST (SGOT) 10/29/2019 35  14 - 36 U/L Final   • Alkaline Phosphatase 10/29/2019 70  38 - 126 U/L Final   • Total Bilirubin 10/29/2019 0.5  0.2 - 1.3 mg/dL Final   • eGFR Non African  Amer 10/29/2019 82  39 - 90 mL/min/1.73 Final   • Globulin 10/29/2019 3.3  2.3 - 3.5 gm/dL Final   • A/G Ratio 10/29/2019 1.3  1.1 - 1.8 g/dL Final   • BUN/Creatinine Ratio 10/29/2019 15.7  7.0 - 25.0 Final   • Anion Gap 10/29/2019 10.0  5.0 - 15.0 mmol/L Final   • Total Cholesterol 10/29/2019 191  150 - 200 mg/dL Final   • Triglycerides 10/29/2019 229* <=150 mg/dL Final   • HDL Cholesterol 10/29/2019 39* 40 - 59 mg/dL Final   • LDL Cholesterol  10/29/2019 106* <=100 mg/dL Final   • VLDL Cholesterol 10/29/2019 45.8  mg/dL Final   • LDL/HDL Ratio 10/29/2019 2.72  0.00 - 3.22 Final   • WBC 10/29/2019 8.56  3.40 - 10.80 10*3/mm3 Final   • RBC 10/29/2019 4.73  3.77 - 5.28 10*6/mm3 Final   • Hemoglobin 10/29/2019 14.1  12.0 - 15.9 g/dL Final   • Hematocrit 10/29/2019 42.8  34.0 - 46.6 % Final   • MCV 10/29/2019 90.5  79.0 - 97.0 fL Final   • MCH 10/29/2019 29.8  26.6 - 33.0 pg Final   • MCHC 10/29/2019 32.9  31.5 - 35.7 g/dL Final   • RDW 10/29/2019 14.0  12.3 - 15.4 % Final   • RDW-SD 10/29/2019 45.4  37.0 - 54.0 fl Final   • MPV 10/29/2019 10.5  6.0 - 12.0 fL Final   • Platelets 10/29/2019 258  140 - 450 10*3/mm3 Final   • Neutrophil % 10/29/2019 48.4  42.7 - 76.0 % Final   • Lymphocyte % 10/29/2019 39.6  19.6 - 45.3 % Final   • Monocyte % 10/29/2019 7.4  5.0 - 12.0 % Final   • Eosinophil % 10/29/2019 4.4  0.3 - 6.2 % Final   • Basophil % 10/29/2019 0.2  0.0 - 1.5 % Final   • Neutrophils, Absolute 10/29/2019 4.14  1.70 - 7.00 10*3/mm3 Final   • Lymphocytes, Absolute 10/29/2019 3.39* 0.70 - 3.10 10*3/mm3 Final   • Monocytes, Absolute 10/29/2019 0.63  0.10 - 0.90 10*3/mm3 Final   • Eosinophils, Absolute 10/29/2019 0.38  0.00 - 0.40 10*3/mm3 Final   • Basophils, Absolute 10/29/2019 0.02  0.00 - 0.20 10*3/mm3 Final   ]

## 2019-12-11 RX ORDER — ESOMEPRAZOLE MAGNESIUM 40 MG/1
40 CAPSULE, DELAYED RELEASE ORAL
Qty: 90 CAPSULE | Refills: 2 | Status: SHIPPED | OUTPATIENT
Start: 2019-12-11 | End: 2020-10-08

## 2020-06-05 RX ORDER — LISINOPRIL 20 MG/1
TABLET ORAL
Qty: 90 TABLET | Refills: 1 | Status: SHIPPED | OUTPATIENT
Start: 2020-06-05 | End: 2020-12-23

## 2020-10-07 ENCOUNTER — CLINICAL SUPPORT (OUTPATIENT)
Dept: FAMILY MEDICINE CLINIC | Facility: CLINIC | Age: 75
End: 2020-10-07

## 2020-10-07 DIAGNOSIS — Z23 NEED FOR VACCINATION: Primary | ICD-10-CM

## 2020-10-07 PROCEDURE — 90694 VACC AIIV4 NO PRSRV 0.5ML IM: CPT | Performed by: FAMILY MEDICINE

## 2020-10-07 PROCEDURE — G0008 ADMIN INFLUENZA VIRUS VAC: HCPCS | Performed by: FAMILY MEDICINE

## 2020-10-08 RX ORDER — ESOMEPRAZOLE MAGNESIUM 40 MG/1
CAPSULE, DELAYED RELEASE ORAL
Qty: 90 CAPSULE | Refills: 0 | Status: SHIPPED | OUTPATIENT
Start: 2020-10-08 | End: 2020-12-23

## 2020-12-23 RX ORDER — ESOMEPRAZOLE MAGNESIUM 40 MG/1
CAPSULE, DELAYED RELEASE ORAL
Qty: 90 CAPSULE | Refills: 0 | Status: SHIPPED | OUTPATIENT
Start: 2020-12-23 | End: 2021-04-01

## 2020-12-23 RX ORDER — LISINOPRIL 20 MG/1
TABLET ORAL
Qty: 90 TABLET | Refills: 1 | Status: SHIPPED | OUTPATIENT
Start: 2020-12-23 | End: 2021-07-14

## 2021-04-01 RX ORDER — ESOMEPRAZOLE MAGNESIUM 40 MG/1
CAPSULE, DELAYED RELEASE ORAL
Qty: 90 CAPSULE | Refills: 0 | Status: SHIPPED | OUTPATIENT
Start: 2021-04-01 | End: 2021-07-14

## 2021-07-13 ENCOUNTER — LAB (OUTPATIENT)
Dept: LAB | Facility: OTHER | Age: 76
End: 2021-07-13

## 2021-07-13 DIAGNOSIS — I10 BENIGN ESSENTIAL HTN: Primary | ICD-10-CM

## 2021-07-13 DIAGNOSIS — I10 BENIGN ESSENTIAL HTN: ICD-10-CM

## 2021-07-13 LAB
ALBUMIN SERPL-MCNC: 4.3 G/DL (ref 3.5–5)
ALBUMIN/GLOB SERPL: 1.2 G/DL (ref 1.1–1.8)
ALP SERPL-CCNC: 80 U/L (ref 38–126)
ALT SERPL W P-5'-P-CCNC: 27 U/L
ANION GAP SERPL CALCULATED.3IONS-SCNC: 7 MMOL/L (ref 5–15)
AST SERPL-CCNC: 35 U/L (ref 14–36)
BASOPHILS # BLD AUTO: 0.05 10*3/MM3 (ref 0–0.2)
BASOPHILS NFR BLD AUTO: 0.6 % (ref 0–1.5)
BILIRUB SERPL-MCNC: 0.4 MG/DL (ref 0.2–1.3)
BUN SERPL-MCNC: 10 MG/DL (ref 7–23)
BUN/CREAT SERPL: 14.7 (ref 7–25)
CALCIUM SPEC-SCNC: 9.8 MG/DL (ref 8.4–10.2)
CHLORIDE SERPL-SCNC: 104 MMOL/L (ref 101–112)
CHOLEST SERPL-MCNC: 217 MG/DL (ref 150–200)
CO2 SERPL-SCNC: 30 MMOL/L (ref 22–30)
CREAT SERPL-MCNC: 0.68 MG/DL (ref 0.52–1.04)
DEPRECATED RDW RBC AUTO: 45.1 FL (ref 37–54)
EOSINOPHIL # BLD AUTO: 0.34 10*3/MM3 (ref 0–0.4)
EOSINOPHIL NFR BLD AUTO: 4.3 % (ref 0.3–6.2)
ERYTHROCYTE [DISTWIDTH] IN BLOOD BY AUTOMATED COUNT: 13.9 % (ref 12.3–15.4)
GFR SERPL CREATININE-BSD FRML MDRD: 85 ML/MIN/1.73 (ref 39–90)
GLOBULIN UR ELPH-MCNC: 3.5 GM/DL (ref 2.3–3.5)
GLUCOSE SERPL-MCNC: 98 MG/DL (ref 70–99)
HCT VFR BLD AUTO: 41.8 % (ref 34–46.6)
HDLC SERPL-MCNC: 47 MG/DL (ref 40–59)
HGB BLD-MCNC: 14 G/DL (ref 12–15.9)
LDLC SERPL CALC-MCNC: 117 MG/DL
LDLC/HDLC SERPL: 2.31 {RATIO} (ref 0–3.22)
LYMPHOCYTES # BLD AUTO: 3.62 10*3/MM3 (ref 0.7–3.1)
LYMPHOCYTES NFR BLD AUTO: 45.5 % (ref 19.6–45.3)
MCH RBC QN AUTO: 30.3 PG (ref 26.6–33)
MCHC RBC AUTO-ENTMCNC: 33.5 G/DL (ref 31.5–35.7)
MCV RBC AUTO: 90.5 FL (ref 79–97)
MONOCYTES # BLD AUTO: 0.6 10*3/MM3 (ref 0.1–0.9)
MONOCYTES NFR BLD AUTO: 7.5 % (ref 5–12)
NEUTROPHILS NFR BLD AUTO: 3.35 10*3/MM3 (ref 1.7–7)
NEUTROPHILS NFR BLD AUTO: 42.1 % (ref 42.7–76)
PLATELET # BLD AUTO: 247 10*3/MM3 (ref 140–450)
PMV BLD AUTO: 10.8 FL (ref 6–12)
POTASSIUM SERPL-SCNC: 4.6 MMOL/L (ref 3.4–5)
PROT SERPL-MCNC: 7.8 G/DL (ref 6.3–8.6)
RBC # BLD AUTO: 4.62 10*6/MM3 (ref 3.77–5.28)
SODIUM SERPL-SCNC: 141 MMOL/L (ref 137–145)
TRIGL SERPL-MCNC: 307 MG/DL
VLDLC SERPL-MCNC: 53 MG/DL (ref 5–40)
WBC # BLD AUTO: 7.96 10*3/MM3 (ref 3.4–10.8)

## 2021-07-13 PROCEDURE — 36415 COLL VENOUS BLD VENIPUNCTURE: CPT | Performed by: FAMILY MEDICINE

## 2021-07-13 PROCEDURE — 84439 ASSAY OF FREE THYROXINE: CPT | Performed by: FAMILY MEDICINE

## 2021-07-13 PROCEDURE — 80053 COMPREHEN METABOLIC PANEL: CPT | Performed by: FAMILY MEDICINE

## 2021-07-13 PROCEDURE — 84443 ASSAY THYROID STIM HORMONE: CPT | Performed by: FAMILY MEDICINE

## 2021-07-13 PROCEDURE — 80061 LIPID PANEL: CPT | Performed by: FAMILY MEDICINE

## 2021-07-13 PROCEDURE — 85025 COMPLETE CBC W/AUTO DIFF WBC: CPT | Performed by: FAMILY MEDICINE

## 2021-07-14 LAB
T4 FREE SERPL-MCNC: 1.2 NG/DL (ref 0.93–1.7)
TSH SERPL DL<=0.05 MIU/L-ACNC: 3.2 UIU/ML (ref 0.27–4.2)

## 2021-07-14 RX ORDER — LISINOPRIL 20 MG/1
TABLET ORAL
Qty: 90 TABLET | Refills: 1 | Status: SHIPPED | OUTPATIENT
Start: 2021-07-14 | End: 2021-07-20 | Stop reason: SDUPTHER

## 2021-07-14 RX ORDER — ESOMEPRAZOLE MAGNESIUM 40 MG/1
CAPSULE, DELAYED RELEASE ORAL
Qty: 90 CAPSULE | Refills: 0 | Status: SHIPPED | OUTPATIENT
Start: 2021-07-14 | End: 2021-07-20 | Stop reason: SDUPTHER

## 2021-07-20 ENCOUNTER — OFFICE VISIT (OUTPATIENT)
Dept: FAMILY MEDICINE CLINIC | Facility: CLINIC | Age: 76
End: 2021-07-20

## 2021-07-20 VITALS — BODY MASS INDEX: 33.68 KG/M2 | WEIGHT: 183 LBS | HEIGHT: 62 IN

## 2021-07-20 DIAGNOSIS — I10 ESSENTIAL HYPERTENSION: Primary | ICD-10-CM

## 2021-07-20 DIAGNOSIS — M15.9 PRIMARY OSTEOARTHRITIS INVOLVING MULTIPLE JOINTS: ICD-10-CM

## 2021-07-20 DIAGNOSIS — E78.5 HYPERLIPIDEMIA, UNSPECIFIED HYPERLIPIDEMIA TYPE: ICD-10-CM

## 2021-07-20 DIAGNOSIS — K21.9 GASTROESOPHAGEAL REFLUX DISEASE WITHOUT ESOPHAGITIS: ICD-10-CM

## 2021-07-20 PROCEDURE — 99214 OFFICE O/P EST MOD 30 MIN: CPT | Performed by: FAMILY MEDICINE

## 2021-07-20 RX ORDER — ESOMEPRAZOLE MAGNESIUM 40 MG/1
40 CAPSULE, DELAYED RELEASE ORAL
Qty: 90 CAPSULE | Refills: 3 | Status: SHIPPED | OUTPATIENT
Start: 2021-07-20 | End: 2022-07-13 | Stop reason: SDUPTHER

## 2021-07-20 RX ORDER — LISINOPRIL 20 MG/1
20 TABLET ORAL DAILY
Qty: 90 TABLET | Refills: 3 | Status: SHIPPED | OUTPATIENT
Start: 2021-07-20 | End: 2022-07-13 | Stop reason: SDUPTHER

## 2021-07-20 RX ORDER — COLESEVELAM HYDROCHLORIDE 3.75 G/1
3.75 POWDER, FOR SUSPENSION ORAL DAILY
Qty: 30 EACH | Refills: 5 | Status: SHIPPED | OUTPATIENT
Start: 2021-07-20 | End: 2022-07-13 | Stop reason: SDUPTHER

## 2021-07-20 RX ORDER — MELOXICAM 7.5 MG/1
7.5 TABLET ORAL DAILY
Qty: 30 TABLET | Refills: 5 | Status: SHIPPED | OUTPATIENT
Start: 2021-07-20 | End: 2022-07-13

## 2021-07-20 NOTE — PROGRESS NOTES
Subjective   Berna Moon is a 75 y.o. female with hypertension, hyperlipidemia who presents to the office for follow-up and review of labs.  She is having some increasing arthritis pain in the legs and arms recently.  Had recent labs to review.  Lipids remain elevated.  She has not been able to tolerate any statins in the past.  Needs refills of meds for hypertension, reflux    Has a lot of joint pain and wonders if she is a candidate for a prescription arthritis medicine    History of Present Illness   Hypertension   This is a chronic problem. The current episode started more than 1 year ago. The problem has been waxing and waning since onset. Associated symptoms include anxiety, headaches and malaise/fatigue. Pertinent negatives include no blurred vision, chest pain, neck pain, orthopnea, palpitations, peripheral edema, PND, shortness of breath or sweats. There are no associated agents to hypertension. Risk factors for coronary artery disease include dyslipidemia and family history. Past treatments include ace inhibitors.  The current treatment provides moderate improvement. There are no compliance problems.      The following portions of the patient's history were reviewed and updated as appropriate: allergies, current medications, past family history, past medical history, past social history, past surgical history and problem list.    Review of Systems   Constitutional: Positive for fatigue.   HENT: Negative.    Respiratory: Negative.  Negative for shortness of breath.    Cardiovascular: Negative.  Negative for chest pain.   Gastrointestinal: Negative.    Musculoskeletal: Positive for arthralgias. Negative for myalgias.   Skin: Negative.    Allergic/Immunologic: Negative for immunocompromised state.   Neurological: Negative for dizziness, tremors, seizures, syncope, weakness and numbness.   Hematological: Negative.    Psychiatric/Behavioral: Negative for agitation, confusion, dysphoric mood and sleep  "disturbance. The patient is not nervous/anxious.        Objective      Vitals:    07/20/21 0900   Weight: 83 kg (183 lb)   Height: 156.2 cm (61.5\")   PainSc:   8   PainLoc: Leg     Body mass index is 34.02 kg/m².      Physical Exam   Constitutional: She is oriented to person, place, and time. She appears well-developed.   HENT:   Head: Normocephalic and atraumatic.   Nose: Nose normal.   Eyes: Pupils are equal, round, and reactive to light. Conjunctivae are normal.   Neck: No JVD present. No tracheal deviation present. No thyromegaly present.   Cardiovascular: Normal rate, regular rhythm and normal heart sounds.   No murmur heard.  Pulmonary/Chest: Effort normal and breath sounds normal. She has no wheezes.   Abdominal: Soft. Bowel sounds are normal. She exhibits no distension. There is no abdominal tenderness.   Musculoskeletal: Normal range of motion.   Lymphadenopathy:     She has no cervical adenopathy.   Neurological: She is alert and oriented to person, place, and time. Coordination normal.   Skin: Skin is warm and dry. No rash noted.   Nursing note and vitals reviewed.    Lab on 07/13/2021   Component Date Value Ref Range Status   • Glucose 07/13/2021 98  70 - 99 mg/dL Final   • BUN 07/13/2021 10  7 - 23 mg/dL Final   • Creatinine 07/13/2021 0.68  0.52 - 1.04 mg/dL Final   • Sodium 07/13/2021 141  137 - 145 mmol/L Final   • Potassium 07/13/2021 4.6  3.4 - 5.0 mmol/L Final   • Chloride 07/13/2021 104  101 - 112 mmol/L Final   • CO2 07/13/2021 30.0  22.0 - 30.0 mmol/L Final   • Calcium 07/13/2021 9.8  8.4 - 10.2 mg/dL Final   • Total Protein 07/13/2021 7.8  6.3 - 8.6 g/dL Final   • Albumin 07/13/2021 4.30  3.50 - 5.00 g/dL Final   • ALT (SGPT) 07/13/2021 27  <=35 U/L Final   • AST (SGOT) 07/13/2021 35  14 - 36 U/L Final   • Alkaline Phosphatase 07/13/2021 80  38 - 126 U/L Final   • Total Bilirubin 07/13/2021 0.4  0.2 - 1.3 mg/dL Final   • eGFR Non African Amer 07/13/2021 85  39 - 90 mL/min/1.73 Final   • " Globulin 07/13/2021 3.5  2.3 - 3.5 gm/dL Final   • A/G Ratio 07/13/2021 1.2  1.1 - 1.8 g/dL Final   • BUN/Creatinine Ratio 07/13/2021 14.7  7.0 - 25.0 Final   • Anion Gap 07/13/2021 7.0  5.0 - 15.0 mmol/L Final   • TSH 07/13/2021 3.200  0.270 - 4.200 uIU/mL Final   • Free T4 07/13/2021 1.20  0.93 - 1.70 ng/dL Final   • Total Cholesterol 07/13/2021 217* 150 - 200 mg/dL Final   • Triglycerides 07/13/2021 307* <=150 mg/dL Final   • HDL Cholesterol 07/13/2021 47  40 - 59 mg/dL Final   • LDL Cholesterol  07/13/2021 117* <=100 mg/dL Final   • VLDL Cholesterol 07/13/2021 53* 5 - 40 mg/dL Final   • LDL/HDL Ratio 07/13/2021 2.31  0.00 - 3.22 Final   • WBC 07/13/2021 7.96  3.40 - 10.80 10*3/mm3 Final   • RBC 07/13/2021 4.62  3.77 - 5.28 10*6/mm3 Final   • Hemoglobin 07/13/2021 14.0  12.0 - 15.9 g/dL Final   • Hematocrit 07/13/2021 41.8  34.0 - 46.6 % Final   • MCV 07/13/2021 90.5  79.0 - 97.0 fL Final   • MCH 07/13/2021 30.3  26.6 - 33.0 pg Final   • MCHC 07/13/2021 33.5  31.5 - 35.7 g/dL Final   • RDW 07/13/2021 13.9  12.3 - 15.4 % Final   • RDW-SD 07/13/2021 45.1  37.0 - 54.0 fl Final   • MPV 07/13/2021 10.8  6.0 - 12.0 fL Final   • Platelets 07/13/2021 247  140 - 450 10*3/mm3 Final   • Neutrophil % 07/13/2021 42.1* 42.7 - 76.0 % Final   • Lymphocyte % 07/13/2021 45.5* 19.6 - 45.3 % Final   • Monocyte % 07/13/2021 7.5  5.0 - 12.0 % Final   • Eosinophil % 07/13/2021 4.3  0.3 - 6.2 % Final   • Basophil % 07/13/2021 0.6  0.0 - 1.5 % Final   • Neutrophils, Absolute 07/13/2021 3.35  1.70 - 7.00 10*3/mm3 Final   • Lymphocytes, Absolute 07/13/2021 3.62* 0.70 - 3.10 10*3/mm3 Final   • Monocytes, Absolute 07/13/2021 0.60  0.10 - 0.90 10*3/mm3 Final   • Eosinophils, Absolute 07/13/2021 0.34  0.00 - 0.40 10*3/mm3 Final   • Basophils, Absolute 07/13/2021 0.05  0.00 - 0.20 10*3/mm3 Final     Assessment/Plan   Diagnoses and all orders for this visit:    1. Essential hypertension (Primary)  -     lisinopril (PRINIVIL,ZESTRIL) 20 MG  tablet; Take 1 tablet by mouth Daily.  Dispense: 90 tablet; Refill: 3    2. Hyperlipidemia, unspecified hyperlipidemia type    3. Gastroesophageal reflux disease without esophagitis  -     esomeprazole (nexIUM) 40 MG capsule; Take 1 capsule by mouth Every Morning Before Breakfast.  Dispense: 90 capsule; Refill: 3    4. Primary osteoarthritis involving multiple joints  -     meloxicam (Mobic) 7.5 MG tablet; Take 1 tablet by mouth Daily.  Dispense: 30 tablet; Refill: 5    Other orders  -     colesevelam (WELCHOL) 3.75 g pack pack; Take 1 packet by mouth Daily.  Dispense: 30 each; Refill: 5    Continue lisinopril for hypertension    We will add meloxicam for arthritis pain.    Continue Nexium for reflux.      Reviewed labs with her today.  We will try WelChol for lipids as she has not been able to tolerate statins and recheck in 6 weeks  Discussed the patient's BMI with her. BMI is above normal parameters. Follow-up plan includes:  educational material and nutrition counseling.    Lab on 07/13/2021   Component Date Value Ref Range Status   • Glucose 07/13/2021 98  70 - 99 mg/dL Final   • BUN 07/13/2021 10  7 - 23 mg/dL Final   • Creatinine 07/13/2021 0.68  0.52 - 1.04 mg/dL Final   • Sodium 07/13/2021 141  137 - 145 mmol/L Final   • Potassium 07/13/2021 4.6  3.4 - 5.0 mmol/L Final   • Chloride 07/13/2021 104  101 - 112 mmol/L Final   • CO2 07/13/2021 30.0  22.0 - 30.0 mmol/L Final   • Calcium 07/13/2021 9.8  8.4 - 10.2 mg/dL Final   • Total Protein 07/13/2021 7.8  6.3 - 8.6 g/dL Final   • Albumin 07/13/2021 4.30  3.50 - 5.00 g/dL Final   • ALT (SGPT) 07/13/2021 27  <=35 U/L Final   • AST (SGOT) 07/13/2021 35  14 - 36 U/L Final   • Alkaline Phosphatase 07/13/2021 80  38 - 126 U/L Final   • Total Bilirubin 07/13/2021 0.4  0.2 - 1.3 mg/dL Final   • eGFR Non African Amer 07/13/2021 85  39 - 90 mL/min/1.73 Final   • Globulin 07/13/2021 3.5  2.3 - 3.5 gm/dL Final   • A/G Ratio 07/13/2021 1.2  1.1 - 1.8 g/dL Final   •  BUN/Creatinine Ratio 07/13/2021 14.7  7.0 - 25.0 Final   • Anion Gap 07/13/2021 7.0  5.0 - 15.0 mmol/L Final   • TSH 07/13/2021 3.200  0.270 - 4.200 uIU/mL Final   • Free T4 07/13/2021 1.20  0.93 - 1.70 ng/dL Final   • Total Cholesterol 07/13/2021 217* 150 - 200 mg/dL Final   • Triglycerides 07/13/2021 307* <=150 mg/dL Final   • HDL Cholesterol 07/13/2021 47  40 - 59 mg/dL Final   • LDL Cholesterol  07/13/2021 117* <=100 mg/dL Final   • VLDL Cholesterol 07/13/2021 53* 5 - 40 mg/dL Final   • LDL/HDL Ratio 07/13/2021 2.31  0.00 - 3.22 Final   • WBC 07/13/2021 7.96  3.40 - 10.80 10*3/mm3 Final   • RBC 07/13/2021 4.62  3.77 - 5.28 10*6/mm3 Final   • Hemoglobin 07/13/2021 14.0  12.0 - 15.9 g/dL Final   • Hematocrit 07/13/2021 41.8  34.0 - 46.6 % Final   • MCV 07/13/2021 90.5  79.0 - 97.0 fL Final   • MCH 07/13/2021 30.3  26.6 - 33.0 pg Final   • MCHC 07/13/2021 33.5  31.5 - 35.7 g/dL Final   • RDW 07/13/2021 13.9  12.3 - 15.4 % Final   • RDW-SD 07/13/2021 45.1  37.0 - 54.0 fl Final   • MPV 07/13/2021 10.8  6.0 - 12.0 fL Final   • Platelets 07/13/2021 247  140 - 450 10*3/mm3 Final   • Neutrophil % 07/13/2021 42.1* 42.7 - 76.0 % Final   • Lymphocyte % 07/13/2021 45.5* 19.6 - 45.3 % Final   • Monocyte % 07/13/2021 7.5  5.0 - 12.0 % Final   • Eosinophil % 07/13/2021 4.3  0.3 - 6.2 % Final   • Basophil % 07/13/2021 0.6  0.0 - 1.5 % Final   • Neutrophils, Absolute 07/13/2021 3.35  1.70 - 7.00 10*3/mm3 Final   • Lymphocytes, Absolute 07/13/2021 3.62* 0.70 - 3.10 10*3/mm3 Final   • Monocytes, Absolute 07/13/2021 0.60  0.10 - 0.90 10*3/mm3 Final   • Eosinophils, Absolute 07/13/2021 0.34  0.00 - 0.40 10*3/mm3 Final   • Basophils, Absolute 07/13/2021 0.05  0.00 - 0.20 10*3/mm3 Final   ]

## 2021-10-19 ENCOUNTER — CLINICAL SUPPORT (OUTPATIENT)
Dept: FAMILY MEDICINE CLINIC | Facility: CLINIC | Age: 76
End: 2021-10-19

## 2021-10-19 DIAGNOSIS — Z23 NEED FOR VACCINATION: Primary | ICD-10-CM

## 2021-10-19 PROCEDURE — G0008 ADMIN INFLUENZA VIRUS VAC: HCPCS | Performed by: FAMILY MEDICINE

## 2021-10-19 PROCEDURE — 90662 IIV NO PRSV INCREASED AG IM: CPT | Performed by: FAMILY MEDICINE

## 2022-07-13 ENCOUNTER — LAB (OUTPATIENT)
Dept: LAB | Facility: OTHER | Age: 77
End: 2022-07-13

## 2022-07-13 ENCOUNTER — OFFICE VISIT (OUTPATIENT)
Dept: FAMILY MEDICINE CLINIC | Facility: CLINIC | Age: 77
End: 2022-07-13

## 2022-07-13 VITALS
TEMPERATURE: 97.9 F | HEIGHT: 62 IN | BODY MASS INDEX: 32.9 KG/M2 | OXYGEN SATURATION: 97 % | HEART RATE: 74 BPM | DIASTOLIC BLOOD PRESSURE: 96 MMHG | WEIGHT: 178.8 LBS | SYSTOLIC BLOOD PRESSURE: 168 MMHG

## 2022-07-13 DIAGNOSIS — I10 ESSENTIAL HYPERTENSION: Primary | ICD-10-CM

## 2022-07-13 DIAGNOSIS — E78.5 HYPERLIPIDEMIA, UNSPECIFIED HYPERLIPIDEMIA TYPE: ICD-10-CM

## 2022-07-13 DIAGNOSIS — I10 ESSENTIAL HYPERTENSION: ICD-10-CM

## 2022-07-13 DIAGNOSIS — Z12.31 ENCOUNTER FOR SCREENING MAMMOGRAM FOR MALIGNANT NEOPLASM OF BREAST: ICD-10-CM

## 2022-07-13 DIAGNOSIS — K21.9 GASTROESOPHAGEAL REFLUX DISEASE WITHOUT ESOPHAGITIS: ICD-10-CM

## 2022-07-13 DIAGNOSIS — Z78.0 POSTMENOPAUSAL: ICD-10-CM

## 2022-07-13 DIAGNOSIS — H81.13 BENIGN PAROXYSMAL POSITIONAL VERTIGO DUE TO BILATERAL VESTIBULAR DISORDER: ICD-10-CM

## 2022-07-13 DIAGNOSIS — R73.9 HYPERGLYCEMIA: ICD-10-CM

## 2022-07-13 DIAGNOSIS — N95.2 ATROPHIC VAGINITIS: ICD-10-CM

## 2022-07-13 LAB
ALBUMIN SERPL-MCNC: 4.2 G/DL (ref 3.5–5)
ALBUMIN/GLOB SERPL: 1.2 G/DL (ref 1.1–1.8)
ALP SERPL-CCNC: 78 U/L (ref 38–126)
ALT SERPL W P-5'-P-CCNC: 38 U/L
ANION GAP SERPL CALCULATED.3IONS-SCNC: 9 MMOL/L (ref 5–15)
AST SERPL-CCNC: 41 U/L (ref 14–36)
BASOPHILS # BLD AUTO: 0.03 10*3/MM3 (ref 0–0.2)
BASOPHILS NFR BLD AUTO: 0.4 % (ref 0–1.5)
BILIRUB SERPL-MCNC: 0.6 MG/DL (ref 0.2–1.3)
BUN SERPL-MCNC: 10 MG/DL (ref 7–23)
BUN/CREAT SERPL: 14.9 (ref 7–25)
CALCIUM SPEC-SCNC: 9.5 MG/DL (ref 8.4–10.2)
CHLORIDE SERPL-SCNC: 104 MMOL/L (ref 101–112)
CHOLEST SERPL-MCNC: 186 MG/DL (ref 150–200)
CO2 SERPL-SCNC: 28 MMOL/L (ref 22–30)
CREAT SERPL-MCNC: 0.67 MG/DL (ref 0.52–1.04)
DEPRECATED RDW RBC AUTO: 45 FL (ref 37–54)
EGFRCR SERPLBLD CKD-EPI 2021: 91.3 ML/MIN/1.73
EOSINOPHIL # BLD AUTO: 0.23 10*3/MM3 (ref 0–0.4)
EOSINOPHIL NFR BLD AUTO: 2.8 % (ref 0.3–6.2)
ERYTHROCYTE [DISTWIDTH] IN BLOOD BY AUTOMATED COUNT: 13.9 % (ref 12.3–15.4)
GLOBULIN UR ELPH-MCNC: 3.4 GM/DL (ref 2.3–3.5)
GLUCOSE SERPL-MCNC: 97 MG/DL (ref 70–99)
HCT VFR BLD AUTO: 41.8 % (ref 34–46.6)
HDLC SERPL-MCNC: 42 MG/DL (ref 40–59)
HGB BLD-MCNC: 13.6 G/DL (ref 12–15.9)
LDLC SERPL CALC-MCNC: 105 MG/DL
LDLC/HDLC SERPL: 2.36 {RATIO} (ref 0–3.22)
LYMPHOCYTES # BLD AUTO: 3.83 10*3/MM3 (ref 0.7–3.1)
LYMPHOCYTES NFR BLD AUTO: 46 % (ref 19.6–45.3)
MCH RBC QN AUTO: 29.4 PG (ref 26.6–33)
MCHC RBC AUTO-ENTMCNC: 32.5 G/DL (ref 31.5–35.7)
MCV RBC AUTO: 90.3 FL (ref 79–97)
MONOCYTES # BLD AUTO: 0.57 10*3/MM3 (ref 0.1–0.9)
MONOCYTES NFR BLD AUTO: 6.8 % (ref 5–12)
NEUTROPHILS NFR BLD AUTO: 3.67 10*3/MM3 (ref 1.7–7)
NEUTROPHILS NFR BLD AUTO: 44 % (ref 42.7–76)
PLATELET # BLD AUTO: 249 10*3/MM3 (ref 140–450)
PMV BLD AUTO: 10.7 FL (ref 6–12)
POTASSIUM SERPL-SCNC: 4.2 MMOL/L (ref 3.4–5)
PROT SERPL-MCNC: 7.6 G/DL (ref 6.3–8.6)
RBC # BLD AUTO: 4.63 10*6/MM3 (ref 3.77–5.28)
SODIUM SERPL-SCNC: 141 MMOL/L (ref 137–145)
TRIGL SERPL-MCNC: 224 MG/DL
VLDLC SERPL-MCNC: 39 MG/DL (ref 5–40)
WBC NRBC COR # BLD: 8.33 10*3/MM3 (ref 3.4–10.8)

## 2022-07-13 PROCEDURE — 99214 OFFICE O/P EST MOD 30 MIN: CPT | Performed by: FAMILY MEDICINE

## 2022-07-13 PROCEDURE — 36415 COLL VENOUS BLD VENIPUNCTURE: CPT | Performed by: FAMILY MEDICINE

## 2022-07-13 PROCEDURE — 83036 HEMOGLOBIN GLYCOSYLATED A1C: CPT | Performed by: FAMILY MEDICINE

## 2022-07-13 PROCEDURE — 80061 LIPID PANEL: CPT | Performed by: FAMILY MEDICINE

## 2022-07-13 PROCEDURE — 80053 COMPREHEN METABOLIC PANEL: CPT | Performed by: FAMILY MEDICINE

## 2022-07-13 PROCEDURE — 84439 ASSAY OF FREE THYROXINE: CPT | Performed by: FAMILY MEDICINE

## 2022-07-13 PROCEDURE — 84443 ASSAY THYROID STIM HORMONE: CPT | Performed by: FAMILY MEDICINE

## 2022-07-13 PROCEDURE — 85025 COMPLETE CBC W/AUTO DIFF WBC: CPT | Performed by: FAMILY MEDICINE

## 2022-07-13 RX ORDER — LISINOPRIL 20 MG/1
20 TABLET ORAL DAILY
Qty: 90 TABLET | Refills: 3 | Status: SHIPPED | OUTPATIENT
Start: 2022-07-13

## 2022-07-13 RX ORDER — COLESEVELAM HYDROCHLORIDE 3.75 G/1
3.75 POWDER, FOR SUSPENSION ORAL DAILY
Qty: 30 EACH | Refills: 5 | Status: SHIPPED | OUTPATIENT
Start: 2022-07-13 | End: 2022-09-27

## 2022-07-13 RX ORDER — ESOMEPRAZOLE MAGNESIUM 40 MG/1
40 CAPSULE, DELAYED RELEASE ORAL
Qty: 90 CAPSULE | Refills: 3 | Status: SHIPPED | OUTPATIENT
Start: 2022-07-13 | End: 2022-09-27

## 2022-07-13 RX ORDER — FLUTICASONE PROPIONATE 50 MCG
2 SPRAY, SUSPENSION (ML) NASAL DAILY
Qty: 18 ML | Refills: 5 | Status: SHIPPED | OUTPATIENT
Start: 2022-07-13 | End: 2022-09-27

## 2022-07-13 NOTE — PROGRESS NOTES
Subjective   Berna Moon is a 76 y.o. female with hypertension, hyperlipidemia here today for follow-up and concerned about dizziness.  For a few days she has been having episodes of dizziness accompanied by nausea with change in position.  She notices for example when she bends over or turns her head that this occurs.  She does not have any tinnitus or hearing issues that are different or new.  Also she is concerned about her blood sugar.  She notices that she has excessive thirst, voids more often, feels like her mouth is dry.  She was concerned that she could have elevated blood sugars as there is a very strong family history of this.    She is due for mammogram and bone density.    She is due for labs for lipids, hypertension.    She wears a pessary and she uses Premarin vaginal cream with it and needs a refill of this.    Dizziness  Associated symptoms include arthralgias and fatigue. Pertinent negatives include no chest pain, myalgias, numbness or weakness.      Hypertension   This is a chronic problem. The current episode started more than 1 year ago. The problem has been waxing and waning since onset. Associated symptoms include anxiety, headaches and malaise/fatigue. Pertinent negatives include no blurred vision, chest pain, neck pain, orthopnea, palpitations, peripheral edema, PND, shortness of breath or sweats. There are no associated agents to hypertension. Risk factors for coronary artery disease include dyslipidemia and family history. Past treatments include ace inhibitors.  The current treatment provides moderate improvement. There are no compliance problems.      The following portions of the patient's history were reviewed and updated as appropriate: allergies, current medications, past family history, past medical history, past social history, past surgical history and problem list.    Review of Systems   Constitutional: Positive for fatigue.   HENT: Negative.    Respiratory: Negative.   "Negative for shortness of breath.    Cardiovascular: Negative.  Negative for chest pain.   Gastrointestinal: Negative.    Musculoskeletal: Positive for arthralgias. Negative for myalgias.   Skin: Negative.    Allergic/Immunologic: Negative for immunocompromised state.   Neurological: Positive for dizziness. Negative for tremors, seizures, syncope, weakness and numbness.   Hematological: Negative.    Psychiatric/Behavioral: Negative for agitation, confusion, dysphoric mood and sleep disturbance. The patient is not nervous/anxious.        Objective      Vitals:    07/13/22 0956   BP: 168/96   Pulse: 74   Temp: 97.9 °F (36.6 °C)   SpO2: 97%   Weight: 81.1 kg (178 lb 12.8 oz)   Height: 156.2 cm (61.5\")   PainSc:   5   PainLoc: Back     Body mass index is 33.24 kg/m².      Physical Exam   Constitutional: She is oriented to person, place, and time. She appears well-developed.   HENT:   Head: Normocephalic and atraumatic.   Nose: Nose normal.   Eyes: Pupils are equal, round, and reactive to light. Conjunctivae are normal.   Neck: No JVD present. No tracheal deviation present. No thyromegaly present.   Cardiovascular: Normal rate, regular rhythm and normal heart sounds.   No murmur heard.  Pulmonary/Chest: Effort normal and breath sounds normal. She has no wheezes.   Abdominal: Soft. Bowel sounds are normal. She exhibits no distension. There is no abdominal tenderness.   Musculoskeletal: Normal range of motion.   Lymphadenopathy:     She has no cervical adenopathy.   Neurological: She is alert and oriented to person, place, and time. Coordination normal.   Skin: Skin is warm and dry. No rash noted.   Nursing note and vitals reviewed.    Assessment & Plan   Diagnoses and all orders for this visit:    1. Essential hypertension (Primary)  -     lisinopril (PRINIVIL,ZESTRIL) 20 MG tablet; Take 1 tablet by mouth Daily.  Dispense: 90 tablet; Refill: 3  -     Comprehensive Metabolic Panel  -     CBC & Differential; Future    2. " Gastroesophageal reflux disease without esophagitis  -     esomeprazole (nexIUM) 40 MG capsule; Take 1 capsule by mouth Every Morning Before Breakfast.  Dispense: 90 capsule; Refill: 3    3. Postmenopausal  -     DEXA Bone Density Axial    4. Encounter for screening mammogram for malignant neoplasm of breast  -     Mammo Screening Digital Tomosynthesis Bilateral With CAD; Future    5. Hyperlipidemia, unspecified hyperlipidemia type  -     colesevelam (WELCHOL) 3.75 g pack pack; Take 1 packet by mouth Daily.  Dispense: 30 each; Refill: 5  -     Lipid Panel; Future  -     T4, Free  -     TSH    6. Hyperglycemia  -     Hemoglobin A1c    7. Atrophic vaginitis  -     conjugated estrogens (PREMARIN) 0.625 MG/GM vaginal cream; Insert 0.5 g into the vagina 2 (Two) Times a Week.  Dispense: 90 g; Refill: 3    8. Benign paroxysmal positional vertigo due to bilateral vestibular disorder  -     fluticasone (Flonase Allergy Relief) 50 MCG/ACT nasal spray; 2 sprays into the nostril(s) as directed by provider Daily for 30 days.  Dispense: 18 mL; Refill: 5    Continue current medications for hypertension and continue to monitor blood pressures regularly with goal of 130/80 or less    Refill Premarin vaginal cream to use for atrophic vaginitis when she inserts her pessary.    As far as the dizziness, I suspect she is having some BPV, possibly related to allergies.  Will start on Flonase nasal and if not improving within a couple weeks she will let me know    We will check an A1c due to symptoms and strong family history of diabetes as well as personal history of hyperglycemia    Continue Nexium for GERD.    Mammogram, bone density ordered            This document has been electronically signed by Wanda Mendoza MD on July 13, 2022 13:11 CDT

## 2022-07-13 NOTE — PROGRESS NOTES
Notify patient test results are ok overall.  Her cholesterol is a little better than last time.  2 of her liver enzymes are very very slightly elevated and have been this way in the past.  We will just keep an eye on this with rechecking in 6 months

## 2022-07-14 LAB
HBA1C MFR BLD: 5.5 % (ref 4.8–5.6)
T4 FREE SERPL-MCNC: 1.3 NG/DL (ref 0.93–1.7)
TSH SERPL DL<=0.05 MIU/L-ACNC: 2 UIU/ML (ref 0.27–4.2)

## 2022-10-25 ENCOUNTER — CLINICAL SUPPORT (OUTPATIENT)
Dept: FAMILY MEDICINE CLINIC | Facility: CLINIC | Age: 77
End: 2022-10-25

## 2022-10-25 DIAGNOSIS — Z23 NEED FOR INFLUENZA VACCINATION: Primary | ICD-10-CM

## 2022-10-25 PROCEDURE — 90662 IIV NO PRSV INCREASED AG IM: CPT | Performed by: FAMILY MEDICINE

## 2022-10-25 PROCEDURE — 90471 IMMUNIZATION ADMIN: CPT | Performed by: FAMILY MEDICINE

## 2022-11-11 ENCOUNTER — OFFICE VISIT (OUTPATIENT)
Dept: OTOLARYNGOLOGY | Facility: CLINIC | Age: 77
End: 2022-11-11

## 2022-11-11 VITALS — OXYGEN SATURATION: 95 % | HEART RATE: 86 BPM | WEIGHT: 177.4 LBS | BODY MASS INDEX: 34.83 KG/M2 | HEIGHT: 60 IN

## 2022-11-11 DIAGNOSIS — C44.321 SQUAMOUS CELL CARCINOMA OF BRIDGE OF NOSE: Primary | ICD-10-CM

## 2022-11-11 PROCEDURE — 99204 OFFICE O/P NEW MOD 45 MIN: CPT | Performed by: OTOLARYNGOLOGY

## 2022-11-11 NOTE — PROGRESS NOTES
Chief complaint: Nose lesion    Assessment and Plan:  77F with cutaneous nasal SCC, enlarging rapidly    -We discussed the risk, benefits, and alternatives to wide local excision with reconstruction with a full-thickness skin graft.  We discussed that this will be done in the operating room potentially under general anesthesia but also potentially under monitored anesthesia care, I would leave that up to the anesthesia physician.  We discussed that we would take out both the mass and a cuff of normal tissue around the mass to ensure that we did not have any cancer cells left in the region.  We discussed that the risk would include bleeding, infection, wound healing issues, both at the primary resection site and at the full-thickness graft donor site, the potential for recurrence, the potential for the need for further procedures.  We also discussed the expected postoperative care including the need for a bolster for 1 week following surgery to aid in graft take, the need for local wound care to both the bolster site and the donor site, the potential for swelling causing vision changes on the right side as this is near her eye, and the potential for scarring leading to the need for future procedures related to the proximity to her right lower eyelid.  The patient expressed understanding of these risks and wishes to proceed as discussed.  All questions regarding surgery were answered at today's visit  -We did discuss that she should avoid using NSAIDs from now until her surgery on Wednesday, as these can increase the risk of bleeding slightly  -It is okay to take Tylenol as needed    History of present illness:    Ms. Moon is a 77-year-old female presenting for evaluation of a nasal squamous of carcinoma.  She states that she first noticed a rough area over the nasal bridge about 2 months ago, this began enlarging in size and she was evaluated by urgent care on 927 and sent to dermatology.  The APRN at the  dermatology clinic performed a biopsy which returned squamous cell carcinoma and she was referred here for further management.  She denies any previous history of cutaneous skin cancers, but does note that at her dermatology APRN visit several other facial lesions were treated with cryotherapy.  She states that there has been a rough area on the right side of the nose for many years that is unchanging this is inferior to the mass in question.  She states she has been unable to wear her glasses due to this lesion.  It has been tender, has drained some blood at times.  She denies any other masses or lesions within the face or the neck.  She does not take any blood thinners or antiplatelet medications, but does use Tylenol and ibuprofen as needed for general muscle aches and pains.  No other acute ENT concerns today.    Vital Signs   Vitals:    11/11/22 1010   Pulse: 86   SpO2: 95%     Physical Exam:  General: NAD, awake and alert  Head: normocephalic, atraumatic  Eyes: EOMI, sclerae white, conjunctivae pink  Ears: pinnae intact without masses or lesions  Nose: external with a 1x0.5cm ulcerated, crusted, raised flesh-toned lesion, mobile from underlying structures with rolled edges just right of nasion, ~1cm from lower lid margin on the right   Mucosa pink, not edematous. No polyps seen. No purulence.   Septum: midline   Turbinates: not hypertrophied  OC/OP: mucosa moist and pink  Neck: supple, no masses or lesions.   Neuro:  no focal deficits    Results Review:  Urgent care APRN note from 9/27/2022 reviewed today and demonstrates complaint of right nose lesion that suddenly arose, exam at that visit notes a right sided nasal lesion with central ulceration, no size given, no comment made about drainage, bleeding.  Dermatology APRN note from 10/4/2022 reviewed today and demonstrates a raised pearly papule with telangiectasia of the right upper nose, there is an image that is too grainy to make out where exactly this was  located.  A punch biopsy was performed at that visit  Pathology from 10/4/2022 reviewed today and demonstrates squamous cell carcinoma, no comment is made about involvement of the deep margin of the biopsy, margins in general.    Review of Systems:  Positive ROS items: Appetite change, postnasal drip, sneezing, eye discharge, eye itching, cough, diarrhea, nausea, arthritis, back pain, and dizziness.  Otherwise, a 14 system ROS is negative except as pertinent positives are mentioned above.    Histories:  Allergies   Allergen Reactions   • Crestor [Rosuvastatin Calcium]    • Lipitor [Atorvastatin]    • Statins      Statins-Hmg-Coa Reductase Inhibitors   • Zetia [Ezetimibe]        Prior to Admission medications    Medication Sig Start Date End Date Taking? Authorizing Provider   lisinopril (PRINIVIL,ZESTRIL) 20 MG tablet Take 1 tablet by mouth Daily. 7/13/22   Wanda Mendoza MD       Past Medical History:   Diagnosis Date   • Chronic diarrhea    • Cystocele    • Degenerative joint disease involving multiple joints    • Essential hypertension    • Essential hypertension    • Fatigue    • Fibrocystic disease of both breasts    • First degree atrioventricular block    • Gastritis    • Gastroesophageal reflux disease    • H/O screening mammography    • Hyperlipidemia    • Hypertension    • Hypertension    • Hypotensive episode    • Irritable bowel    • Menopause    • Muscle weakness    • Nausea and vomiting    • Need for influenza vaccination    • Need for prophylactic vaccination against viral disease    • Need for prophylactic vaccination and inoculation against influenza    • Osteoarthritis of knee    • Sleep apnea    • Urinary tract infection    • Urinary tract infection    • Visit for gynecologic examination    • Vulvovaginitis        Past Surgical History:   Procedure Laterality Date   • BREAST BIOPSY Bilateral     1 Right, 2 Left all benign   • BREAST LUMPECTOMY     • CHOLECYSTECTOMY  2014   • HYSTERECTOMY  1976     partial wo bso   • INCONTINENCE SURGERY  1976       Social History     Socioeconomic History   • Marital status:    Tobacco Use   • Smoking status: Never   • Smokeless tobacco: Never   Substance and Sexual Activity   • Alcohol use: No   • Drug use: No   • Sexual activity: Never     Comment:        Family History   Problem Relation Age of Onset   • Hypertension Mother    • Other Mother         Thyroid disorder   • Cancer Father         Prostate Cancer   • Hypertension Sister    • Hypertension Brother    • Hypertension Brother    • Liver cancer Maternal Grandmother              This document has been electronically signed by Dione Shaffer MD on November 11, 2022 09:53 CST

## 2022-11-14 ENCOUNTER — PRE-ADMISSION TESTING (OUTPATIENT)
Dept: PREADMISSION TESTING | Facility: HOSPITAL | Age: 77
End: 2022-11-14

## 2022-11-14 VITALS
WEIGHT: 177 LBS | DIASTOLIC BLOOD PRESSURE: 80 MMHG | HEART RATE: 82 BPM | HEIGHT: 60 IN | BODY MASS INDEX: 34.75 KG/M2 | OXYGEN SATURATION: 96 % | SYSTOLIC BLOOD PRESSURE: 120 MMHG | RESPIRATION RATE: 16 BRPM

## 2022-11-14 PROCEDURE — 93010 ELECTROCARDIOGRAM REPORT: CPT | Performed by: INTERNAL MEDICINE

## 2022-11-14 PROCEDURE — 93005 ELECTROCARDIOGRAM TRACING: CPT

## 2022-11-14 RX ORDER — SODIUM CHLORIDE, SODIUM GLUCONATE, SODIUM ACETATE, POTASSIUM CHLORIDE AND MAGNESIUM CHLORIDE 526; 502; 368; 37; 30 MG/100ML; MG/100ML; MG/100ML; MG/100ML; MG/100ML
1000 INJECTION, SOLUTION INTRAVENOUS CONTINUOUS PRN
Status: CANCELLED | OUTPATIENT
Start: 2022-11-16

## 2022-11-14 NOTE — PAT
Dr. Fan here to review EKG and pt history no further orders or instructions  Opioid pain medication pamphlet given..

## 2022-11-14 NOTE — DISCHARGE INSTRUCTIONS
[facility]  Pre-op Information and Guidelines    You will be called after 2 p.m. the day before your surgery (Friday for Monday surgery) and notified of your time for arrival and approximate surgery time.  If you have not received a call by 4P.M., please contact Same Day Surgery at [phone] of if outside [facility] call [phone].    Please Follow these Important Safety Guidelines:    The morning of your procedure, take only the medications listed below with   A sip of water:_____________________________________________       ______________________________________________    DO NOT eat or drink anything after 12:00 midnight the night before surgery  Specific instructions concerning drinking clear liquids will be discussed during  the pre-surgery instruction call the day before your surgery.    If you take a blood thinner (ex. Plavix, Coumadin, aspirin), ask your doctor when to stop it before surgery  STOP DATE: _________________    Only 1 visitor is allowed in patient rooms at a time  Your visitors will be asked to wait in the lobby until the admission process is complete with the exception of a parent with a child and patients in need of special assistance.    YOU CANNOT DRIVE YOURSELF HOME  You must be accompanied by someone who will be responsible for driving you home after surgery and for your care at home.    DO NOT chew gum, use breath mints, hard candy, or smoke the day of surgery  DO NOT drink alcohol for at least 24 hours before your surgery  DO NOT wear any jewelry and remove all body piercing before coming to the hospital  DO NOT wear make-up to the hospital  If you are having surgery on an extremity (arm/leg/foot) remove nail polish/artificial nails on the surgical side  Clothing, glasses, contacts, dentures, and hairpieces must be removed before surgery  Bathe the night before or the morning of your surgery and do not use powders/lotions on skin.

## 2022-11-15 ENCOUNTER — ANESTHESIA EVENT (OUTPATIENT)
Dept: PERIOP | Facility: HOSPITAL | Age: 77
End: 2022-11-15
Payer: MEDICARE

## 2022-11-15 LAB
QT INTERVAL: 390 MS
QTC INTERVAL: 472 MS

## 2022-11-16 ENCOUNTER — ANESTHESIA (OUTPATIENT)
Dept: PERIOP | Facility: HOSPITAL | Age: 77
End: 2022-11-16
Payer: MEDICARE

## 2022-11-16 ENCOUNTER — HOSPITAL ENCOUNTER (OUTPATIENT)
Facility: HOSPITAL | Age: 77
Setting detail: HOSPITAL OUTPATIENT SURGERY
Discharge: HOME | End: 2022-11-16
Attending: OTOLARYNGOLOGY | Admitting: OTOLARYNGOLOGY
Payer: MEDICARE

## 2022-11-16 VITALS
SYSTOLIC BLOOD PRESSURE: 119 MMHG | BODY MASS INDEX: 34.67 KG/M2 | HEART RATE: 112 BPM | OXYGEN SATURATION: 94 % | TEMPERATURE: 96.9 F | HEIGHT: 60 IN | WEIGHT: 176.59 LBS | RESPIRATION RATE: 18 BRPM | DIASTOLIC BLOOD PRESSURE: 67 MMHG

## 2022-11-16 DIAGNOSIS — C44.321 SQUAMOUS CELL CARCINOMA OF BRIDGE OF NOSE: ICD-10-CM

## 2022-11-16 PROCEDURE — 25010000002 PHENYLEPHRINE 10 MG/ML SOLUTION

## 2022-11-16 PROCEDURE — 25010000002 PROPOFOL 200 MG/20ML EMULSION

## 2022-11-16 PROCEDURE — 88331 PATH CONSLTJ SURG 1 BLK 1SPC: CPT

## 2022-11-16 PROCEDURE — 25010000002 MIDAZOLAM PER 1 MG

## 2022-11-16 PROCEDURE — 11643 EXC F/E/E/N/L MAL+MRG 2.1-3: CPT | Performed by: OTOLARYNGOLOGY

## 2022-11-16 PROCEDURE — 25010000002 FENTANYL CITRATE PF 50 MCG/ML SOLUTION PREFILLED SYRINGE

## 2022-11-16 PROCEDURE — 25010000002 ONDANSETRON PER 1 MG

## 2022-11-16 PROCEDURE — 15760 COMPOSITE SKIN GRAFT: CPT | Performed by: OTOLARYNGOLOGY

## 2022-11-16 PROCEDURE — 25010000002 DEXAMETHASONE PER 1 MG

## 2022-11-16 PROCEDURE — 88305 TISSUE EXAM BY PATHOLOGIST: CPT

## 2022-11-16 RX ORDER — ONDANSETRON 2 MG/ML
4 INJECTION INTRAMUSCULAR; INTRAVENOUS ONCE AS NEEDED
Status: DISCONTINUED | OUTPATIENT
Start: 2022-11-16 | End: 2022-11-16 | Stop reason: HOSPADM

## 2022-11-16 RX ORDER — SODIUM CHLORIDE, SODIUM GLUCONATE, SODIUM ACETATE, POTASSIUM CHLORIDE AND MAGNESIUM CHLORIDE 526; 502; 368; 37; 30 MG/100ML; MG/100ML; MG/100ML; MG/100ML; MG/100ML
1000 INJECTION, SOLUTION INTRAVENOUS CONTINUOUS PRN
Status: DISCONTINUED | OUTPATIENT
Start: 2022-11-16 | End: 2022-11-16 | Stop reason: HOSPADM

## 2022-11-16 RX ORDER — VASOPRESSIN 20 U/ML
INJECTION PARENTERAL AS NEEDED
Status: DISCONTINUED | OUTPATIENT
Start: 2022-11-16 | End: 2022-11-16 | Stop reason: SURG

## 2022-11-16 RX ORDER — NEOMYCIN SULFATE, POLYMYXIN B SULFATE AND BACITRACIN ZINC 3.5; 10000; 4 MG/G; [USP'U]/G; [USP'U]/G
OINTMENT OPHTHALMIC ONCE
Status: DISCONTINUED | OUTPATIENT
Start: 2022-11-16 | End: 2022-11-16 | Stop reason: HOSPADM

## 2022-11-16 RX ORDER — EPHEDRINE SULFATE 50 MG/ML
5 INJECTION, SOLUTION INTRAVENOUS ONCE AS NEEDED
Status: DISCONTINUED | OUTPATIENT
Start: 2022-11-16 | End: 2022-11-16 | Stop reason: HOSPADM

## 2022-11-16 RX ORDER — PROPOFOL 10 MG/ML
INJECTION, EMULSION INTRAVENOUS AS NEEDED
Status: DISCONTINUED | OUTPATIENT
Start: 2022-11-16 | End: 2022-11-16 | Stop reason: SURG

## 2022-11-16 RX ORDER — FENTANYL CITRATE 50 UG/ML
INJECTION, SOLUTION INTRAMUSCULAR; INTRAVENOUS AS NEEDED
Status: DISCONTINUED | OUTPATIENT
Start: 2022-11-16 | End: 2022-11-16 | Stop reason: SURG

## 2022-11-16 RX ORDER — BUPIVACAINE HYDROCHLORIDE 2.5 MG/ML
INJECTION, SOLUTION EPIDURAL; INFILTRATION; INTRACAUDAL AS NEEDED
Status: DISCONTINUED | OUTPATIENT
Start: 2022-11-16 | End: 2022-11-16 | Stop reason: HOSPADM

## 2022-11-16 RX ORDER — EPHEDRINE SULFATE 50 MG/ML
INJECTION INTRAVENOUS AS NEEDED
Status: DISCONTINUED | OUTPATIENT
Start: 2022-11-16 | End: 2022-11-16 | Stop reason: SURG

## 2022-11-16 RX ORDER — DEXAMETHASONE SODIUM PHOSPHATE 4 MG/ML
INJECTION, SOLUTION INTRA-ARTICULAR; INTRALESIONAL; INTRAMUSCULAR; INTRAVENOUS; SOFT TISSUE AS NEEDED
Status: DISCONTINUED | OUTPATIENT
Start: 2022-11-16 | End: 2022-11-16 | Stop reason: SURG

## 2022-11-16 RX ORDER — SODIUM CHLORIDE 9 MG/ML
INJECTION INTRAVENOUS AS NEEDED
Status: DISCONTINUED | OUTPATIENT
Start: 2022-11-16 | End: 2022-11-16 | Stop reason: SURG

## 2022-11-16 RX ORDER — ERYTHROMYCIN 5 MG/G
OINTMENT OPHTHALMIC
Qty: 3.5 G | Refills: 11 | Status: SHIPPED | OUTPATIENT
Start: 2022-11-16

## 2022-11-16 RX ORDER — NALOXONE HCL 0.4 MG/ML
0.4 VIAL (ML) INJECTION AS NEEDED
Status: DISCONTINUED | OUTPATIENT
Start: 2022-11-16 | End: 2022-11-16 | Stop reason: HOSPADM

## 2022-11-16 RX ORDER — LIDOCAINE HYDROCHLORIDE 20 MG/ML
INJECTION, SOLUTION EPIDURAL; INFILTRATION; INTRACAUDAL; PERINEURAL AS NEEDED
Status: DISCONTINUED | OUTPATIENT
Start: 2022-11-16 | End: 2022-11-16 | Stop reason: SURG

## 2022-11-16 RX ORDER — LIDOCAINE HYDROCHLORIDE AND EPINEPHRINE 10; 10 MG/ML; UG/ML
INJECTION, SOLUTION INFILTRATION; PERINEURAL AS NEEDED
Status: DISCONTINUED | OUTPATIENT
Start: 2022-11-16 | End: 2022-11-16 | Stop reason: HOSPADM

## 2022-11-16 RX ORDER — PHENYLEPHRINE HCL IN 0.9% NACL 0.5 MG/5ML
SYRINGE (ML) INTRAVENOUS AS NEEDED
Status: DISCONTINUED | OUTPATIENT
Start: 2022-11-16 | End: 2022-11-16 | Stop reason: SURG

## 2022-11-16 RX ORDER — DIPHENHYDRAMINE HYDROCHLORIDE 50 MG/ML
12.5 INJECTION INTRAMUSCULAR; INTRAVENOUS
Status: DISCONTINUED | OUTPATIENT
Start: 2022-11-16 | End: 2022-11-16 | Stop reason: HOSPADM

## 2022-11-16 RX ORDER — ACETAMINOPHEN 325 MG/1
650 TABLET ORAL ONCE AS NEEDED
Status: DISCONTINUED | OUTPATIENT
Start: 2022-11-16 | End: 2022-11-16 | Stop reason: HOSPADM

## 2022-11-16 RX ORDER — FLUMAZENIL 0.1 MG/ML
0.2 INJECTION INTRAVENOUS AS NEEDED
Status: DISCONTINUED | OUTPATIENT
Start: 2022-11-16 | End: 2022-11-16 | Stop reason: HOSPADM

## 2022-11-16 RX ORDER — ONDANSETRON 2 MG/ML
INJECTION INTRAMUSCULAR; INTRAVENOUS AS NEEDED
Status: DISCONTINUED | OUTPATIENT
Start: 2022-11-16 | End: 2022-11-16 | Stop reason: SURG

## 2022-11-16 RX ORDER — BALANCED SALT SOLUTION 6.4; .75; .48; .3; 3.9; 1.7 MG/ML; MG/ML; MG/ML; MG/ML; MG/ML; MG/ML
SOLUTION OPHTHALMIC AS NEEDED
Status: DISCONTINUED | OUTPATIENT
Start: 2022-11-16 | End: 2022-11-16 | Stop reason: HOSPADM

## 2022-11-16 RX ORDER — ACETAMINOPHEN 650 MG/1
650 SUPPOSITORY RECTAL ONCE AS NEEDED
Status: DISCONTINUED | OUTPATIENT
Start: 2022-11-16 | End: 2022-11-16 | Stop reason: HOSPADM

## 2022-11-16 RX ORDER — MIDAZOLAM HYDROCHLORIDE 1 MG/ML
INJECTION INTRAMUSCULAR; INTRAVENOUS AS NEEDED
Status: DISCONTINUED | OUTPATIENT
Start: 2022-11-16 | End: 2022-11-16 | Stop reason: SURG

## 2022-11-16 RX ORDER — MEPERIDINE HYDROCHLORIDE 25 MG/ML
12.5 INJECTION INTRAMUSCULAR; INTRAVENOUS; SUBCUTANEOUS
Status: DISCONTINUED | OUTPATIENT
Start: 2022-11-16 | End: 2022-11-16 | Stop reason: HOSPADM

## 2022-11-16 RX ADMIN — VASOPRESSIN 2 UNITS: 20 INJECTION INTRAVENOUS at 09:28

## 2022-11-16 RX ADMIN — ONDANSETRON 4 MG: 2 INJECTION INTRAMUSCULAR; INTRAVENOUS at 09:10

## 2022-11-16 RX ADMIN — Medication 50 MCG: at 08:58

## 2022-11-16 RX ADMIN — EPHEDRINE SULFATE 5 MG: 50 INJECTION INTRAVENOUS at 09:53

## 2022-11-16 RX ADMIN — VASOPRESSIN 2 UNITS: 20 INJECTION INTRAVENOUS at 11:09

## 2022-11-16 RX ADMIN — LIDOCAINE HYDROCHLORIDE 80 MG: 20 INJECTION, SOLUTION EPIDURAL; INFILTRATION; INTRACAUDAL; PERINEURAL at 08:48

## 2022-11-16 RX ADMIN — DEXAMETHASONE SODIUM PHOSPHATE 4 MG: 4 INJECTION, SOLUTION INTRAMUSCULAR; INTRAVENOUS at 08:56

## 2022-11-16 RX ADMIN — EPHEDRINE SULFATE 5 MG: 50 INJECTION INTRAVENOUS at 10:03

## 2022-11-16 RX ADMIN — VASOPRESSIN 2 UNITS: 20 INJECTION INTRAVENOUS at 09:32

## 2022-11-16 RX ADMIN — SODIUM CHLORIDE, SODIUM GLUCONATE, SODIUM ACETATE, POTASSIUM CHLORIDE AND MAGNESIUM CHLORIDE 1000 ML: 526; 502; 368; 37; 30 INJECTION, SOLUTION INTRAVENOUS at 07:12

## 2022-11-16 RX ADMIN — Medication 150 MCG: at 09:02

## 2022-11-16 RX ADMIN — FENTANYL CITRATE 25 MCG: 50 INJECTION, SOLUTION INTRAMUSCULAR; INTRAVENOUS at 11:09

## 2022-11-16 RX ADMIN — VASOPRESSIN 2 UNITS: 20 INJECTION INTRAVENOUS at 09:10

## 2022-11-16 RX ADMIN — VASOPRESSIN 1 UNITS: 20 INJECTION INTRAVENOUS at 09:14

## 2022-11-16 RX ADMIN — EPHEDRINE SULFATE 10 MG: 50 INJECTION INTRAVENOUS at 09:37

## 2022-11-16 RX ADMIN — VASOPRESSIN 2 UNITS: 20 INJECTION INTRAVENOUS at 09:19

## 2022-11-16 RX ADMIN — VASOPRESSIN 2 UNITS: 20 INJECTION INTRAVENOUS at 10:43

## 2022-11-16 RX ADMIN — SODIUM CHLORIDE, SODIUM GLUCONATE, SODIUM ACETATE, POTASSIUM CHLORIDE AND MAGNESIUM CHLORIDE: 526; 502; 368; 37; 30 INJECTION, SOLUTION INTRAVENOUS at 10:23

## 2022-11-16 RX ADMIN — PROPOFOL 100 MG: 10 INJECTION, EMULSION INTRAVENOUS at 08:48

## 2022-11-16 RX ADMIN — MIDAZOLAM HYDROCHLORIDE 1 MG: 2 INJECTION, SOLUTION INTRAMUSCULAR; INTRAVENOUS at 08:39

## 2022-11-16 RX ADMIN — SODIUM CHLORIDE 10 ML: 9 INJECTION INTRAMUSCULAR; INTRAVENOUS; SUBCUTANEOUS at 09:10

## 2022-11-16 RX ADMIN — Medication 100 MCG: at 09:10

## 2022-11-16 RX ADMIN — VASOPRESSIN 2 UNITS: 20 INJECTION INTRAVENOUS at 09:24

## 2022-11-16 RX ADMIN — EPHEDRINE SULFATE 10 MG: 50 INJECTION INTRAVENOUS at 10:49

## 2022-11-16 RX ADMIN — FENTANYL CITRATE 25 MCG: 50 INJECTION, SOLUTION INTRAMUSCULAR; INTRAVENOUS at 08:45

## 2022-11-16 NOTE — OP NOTE
Otolaryngology Operative Note        PREOPERATIVE DIAGNOSIS: nasal squamous cell carcinoma     POSTOPERATIVE DIAGNOSIS:  Same.     OPERATIVE PROCEDURE:    Wide local excision of nasal lesion, malignant 2.5x2.5cm  Full thickness skin graft 3cmx2.5cm       SURGEON: Dione Shaffer MD    ASSISTANT: Dolores Miller, URIEL for retraction and suctioning.    ANESTHESIA: General with LMA    ESTIMATED BLOOD LOSS DURING PROCEDURE:  25 mL     COMPLICATIONS:  None    FINDINGS:   Raised, ulcerated flesh toned lesion of right nasal bridge 1.1x1.2cm in size mobile from underlying tissue, full excision 2.5x2.5cm  Full thicknes skin graft from right neck - 3x2.5cm    SPECIMENS: nasal lesion for permanent  Margins for frozen - SCC in situ at 6 o'clock margin, additional 6 o'clock margin clear    INDICATIONS:   77F with enlarging right nasal lesion, biopsy demonstrated SCC. She is presenting today after discussion in the clinic for WLE with FTSG for reconstruction.     DESCRIPTION OF PROCEDURE:    The patient was brought to the operating room, placed in supine position on the operating table.  General  The patient was placed in cervical extension using a shoulder roll. 13 mL of 1% lidocaine with 1:100,000 epinephrine mixed 50:50 with 0.25% bupivicaine was injected along the edges of the lesion and an appropriate donor site for graft in the neck. The patient was then was prepped and draped in the standard sterile fashion.  The skin was incised with a 15 blade around the margins and this was sent oriented in sections for frozen analysis. A combination of sharp and bovie dissection was used to excise the main specimen, which was oriented and sent for pathologic analysis.    The donor site was marked with an ellipse of 3x2.5 cm and incised with 15 blade. Blunt and sharp dissection were used to release the great from underlying tissue and the graft defatted sharply.    The surgical bed was irrigated with normal saline.  Hemostasis was achieved  by using bipolar cauterization.     The graft was then positioned and tacked in place with buried 5-0 PDS suture and the skin re-approximated with running 5-0 Fast gut. The donor site was undermined and closed primarily with buried 4-0 Monocryl and running locking 5-0 Fast gut for skin. A Xeroform bolster was fashioned and secured in place with 3-0 Nylon.    This concluded the procedure. The patient was returned to the care of anesthesia in a stable condition for emergence. All counts were correct at the conclusion of the case.    DISPOSITION:  The patient tolerated the procedure well without known intraoperative complication.  The patient was extubated in the operating room and transferred to recovery in stable condition.    Dione Shaffer MD  11/16/22  08:43 CST

## 2022-11-16 NOTE — DISCHARGE INSTRUCTIONS
Wound Care:  Keep your yellow bolster dry - avoid shower/soaking while In place.  Use ointment several times daily to keep the bolster moist - there will be a special persciption ointment that is safe to use near the eye - do not use over the counter ointments near the eye.  Use the same ointment to your donor site incision two times daily - gently clean old ointment from the incision prior to reapplication.    Pain:  Take tylenol (650-1,000mg) and ibuprofen (400-600mg) alternating over the counter for pain control. Do not exceed 4,000mg of tylenol from all sources in a 24hr period.    Notify a Physician:  If you have fevers >100.5 for >24hours, increasing redness, tenderness, or purulent drainage from your surgical site.    Follow up:  FU in 1 week with Dr. Shaffer. Call [phone] with non-emergent questions or concerns during business hours.

## 2022-11-16 NOTE — ANESTHESIA POSTPROCEDURE EVALUATION
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          Post Anesthesia Care and Evaluation    Patient location during evaluation: PACU  Patient participation: complete - patient participated  Level of consciousness: awake and alert  Pain score: 0  Pain management: adequate    Airway patency: patent  Anesthetic complications: No anesthetic complications  PONV Status: none  Cardiovascular status: acceptable  Respiratory status: acceptable and face mask  Hydration status: acceptable    Comments: -------------------------              11/16/22                    0708      1135  -------------------------   BP:         139/70     120/60   Pulse:        73       100   Resp:         18       16   Temp:   96.8 °F (36 °C)97.1   SpO2:         94%      97  -------------------------

## 2022-11-16 NOTE — ANESTHESIA PREPROCEDURE EVALUATION
Anesthesia Evaluation     Patient summary reviewed and Nursing notes reviewed   no history of anesthetic complications:  NPO Solid Status: > 8 hours  NPO Liquid Status: > 8 hours           Airway   Mallampati: II  TM distance: >3 FB  Neck ROM: full  No difficulty expected  Dental    (+) edentulous    Pulmonary     breath sounds clear to auscultation  (+) sleep apnea,   (-) asthma, shortness of breath, rhonchi, decreased breath sounds, wheezes, not a smoker, no home oxygen  Cardiovascular   Exercise tolerance: good (4-7 METS)    ECG reviewed  Rhythm: regular  Rate: normal    (+) hypertension less than 2 medications, hyperlipidemia,   (-) pacemaker, past MI, dysrhythmias, angina, murmur, cardiac stents, DVT    ROS comment: EKG 11/14/2022:  Sinus rhythm with 1st degree AV block  Left bundle branch block  Abnormal ECG  No previous ECGs available    Neuro/Psych  (-) seizures, TIA, CVA  GI/Hepatic/Renal/Endo    (+) obesity,  GERD poorly controlled,    (-) morbid obesity, no renal disease, diabetes    Musculoskeletal     Abdominal   (+) obese,    Substance History      OB/GYN negative ob/gyn ROS         Other   arthritis,    history of cancer active    ROS/Med Hx Other: Nasal squamous of carcinoma.  She states that she first noticed a rough area over the nasal bridge about 2 months ago, this began enlarging in size and she was evaluated by urgent care on 927 and sent to dermatology    Hx of ANTHONY noncompliant with CPAP                Anesthesia Plan    ASA 3     general and MAC     intravenous induction     Anesthetic plan, risks, benefits, and alternatives have been provided, discussed and informed consent has been obtained with: patient and spouse/significant other.  Pre-procedure education provided  Plan discussed with CRNA.        CODE STATUS:

## 2022-11-18 LAB — REF LAB TEST METHOD: NORMAL

## 2022-11-23 ENCOUNTER — OFFICE VISIT (OUTPATIENT)
Dept: OTOLARYNGOLOGY | Facility: CLINIC | Age: 77
End: 2022-11-23

## 2022-11-23 VITALS — HEART RATE: 101 BPM | HEIGHT: 60 IN | BODY MASS INDEX: 34.67 KG/M2 | WEIGHT: 176.59 LBS | OXYGEN SATURATION: 96 %

## 2022-11-23 DIAGNOSIS — C44.321 SQUAMOUS CELL CARCINOMA OF BRIDGE OF NOSE: Primary | ICD-10-CM

## 2022-11-23 PROCEDURE — 99024 POSTOP FOLLOW-UP VISIT: CPT | Performed by: OTOLARYNGOLOGY

## 2022-11-23 NOTE — PROGRESS NOTES
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 Rolle is a 77-year-old female presenting in postoperative follow-up status post wide local excision with full-thickness skin graft to the right nose prescribes her carcinoma.  She states that the bolster has been mildly tender for which she takes Tylenol as needed, but otherwise she has not had any issues.  She has been applying ointment as directed.  No other acute concerns today.    Physical Exam:  Focused exam of the face:  Bolster overlying the right nose removed today  Full-thickness graft appears to have complete take with duskiness overlying the whole graft with mild inferior ingrowth of blood vessels  Incisions intact circumferentially  There does not appear to be any displacement of the lower lid on the right, or any decrease or change in the motion of the lower that on the right.    Vital Signs   Vitals:    11/23/22 1022   Pulse: 101   SpO2: 96%       Results Review:  Operative report from 11/16/2022 reveals wide local excision with full-thickness skin graft from the ipsilateral neck for reconstruction, frozen margins were negative at that time.  Pathology report from 11/16/2022 reviewed today and demonstrates squamous cell carcinoma in situ at 6:00 margin, reexcision clear, for final negative margins.    Histories:  Allergies   Allergen Reactions   • Crestor [Rosuvastatin  Calcium]    • Lipitor [Atorvastatin]    • Statins      Statins-Hmg-Coa Reductase Inhibitors   • Zetia [Ezetimibe]        Prior to Admission medications    Medication Sig Start Date End Date Taking? Authorizing Provider   erythromycin (ROMYCIN) 5 MG/GM ophthalmic ointment Apply a thin layer topically to affected area as directed to bolster 2 to 3 times daily to keep moist. 11/16/22  Yes Dione Shaffer MD   lisinopril (PRINIVIL,ZESTRIL) 20 MG tablet Take 1 tablet by mouth Daily. 7/13/22  Yes Wanda Mendoza MD       Past Medical History:   Diagnosis Date   • Chronic diarrhea    • Cystocele    • Degenerative joint disease involving multiple joints    • Eczema    • Elevated cholesterol    • Essential hypertension    • Essential hypertension    • Fatigue    • Fibrocystic disease of both breasts    • First degree atrioventricular block    • Gastritis    • Gastroesophageal reflux disease    • H/O screening mammography    • Hyperlipidemia    • Hypertension    • Hypertension    • Hypotensive episode    • Irritable bowel    • Menopause    • Muscle weakness    • Nausea and vomiting    • Need for influenza vaccination    • Need for prophylactic vaccination against viral disease    • Need for prophylactic vaccination and inoculation against influenza    • Osteoarthritis of knee    • Sleep apnea    • Urinary tract infection    • Urinary tract infection    • Visit for gynecologic examination    • Vulvovaginitis    • Wears dentures    • Wears glasses        Past Surgical History:   Procedure Laterality Date   • BREAST BIOPSY Bilateral     1 Right, 2 Left all benign   • BREAST LUMPECTOMY     • CHOLECYSTECTOMY  2014   • HYSTERECTOMY  1976    partial wo bso   • INCONTINENCE SURGERY  1976       Social History     Socioeconomic History   • Marital status:    Tobacco Use   • Smoking status: Never   • Smokeless tobacco: Never   Vaping Use   • Vaping Use: Never used   Substance and Sexual Activity   • Alcohol use: No   • Drug use:  No   • Sexual activity: Never     Comment:        Family History   Problem Relation Age of Onset   • Hypertension Mother    • Other Mother         Thyroid disorder   • Cancer Father         Prostate Cancer   • Hypertension Sister    • Hypertension Brother    • Hypertension Brother    • Liver cancer Maternal Grandmother          This document has been electronically signed by Dione Shaffer MD on November 23, 2022 12:27 CST

## 2022-12-22 ENCOUNTER — OFFICE VISIT (OUTPATIENT)
Dept: OTOLARYNGOLOGY | Facility: CLINIC | Age: 77
End: 2022-12-22

## 2022-12-22 VITALS — HEIGHT: 60 IN | BODY MASS INDEX: 34.55 KG/M2 | HEART RATE: 97 BPM | OXYGEN SATURATION: 96 % | WEIGHT: 176 LBS

## 2022-12-22 DIAGNOSIS — C44.321 SQUAMOUS CELL CARCINOMA OF BRIDGE OF NOSE: Primary | ICD-10-CM

## 2022-12-22 PROCEDURE — 99024 POSTOP FOLLOW-UP VISIT: CPT | Performed by: OTOLARYNGOLOGY

## 2023-04-05 ENCOUNTER — OFFICE VISIT (OUTPATIENT)
Dept: OTOLARYNGOLOGY | Facility: CLINIC | Age: 78
End: 2023-04-05
Payer: COMMERCIAL

## 2023-04-05 VITALS — HEIGHT: 60 IN | WEIGHT: 176.8 LBS | HEART RATE: 83 BPM | BODY MASS INDEX: 34.71 KG/M2

## 2023-04-05 DIAGNOSIS — C44.321 SQUAMOUS CELL CARCINOMA OF BRIDGE OF NOSE: Primary | ICD-10-CM

## 2023-04-05 PROCEDURE — 99213 OFFICE O/P EST LOW 20 MIN: CPT | Performed by: OTOLARYNGOLOGY

## 2023-04-05 NOTE — PROGRESS NOTES
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 She continues not to wear glasses as the graft is somewhat tender after some time. No other acute concerns today.  Physical Exam:  Focused examination of the face:  There are no concerning lesions, complete healing of the FTSG without dehiscence or breakdown. Adequate color match to surrounding tissue. Full movement of eydlid  without tethering.  Neck donor site completely healed, incision intact and appropriate    Vital Signs   Vitals:    04/05/23 0957   Pulse: 83       Results Review:  Prior clinic visit from 12/22/2022 reviewed today and demonstrates appropriate healing of FTSG with minor eschar at that time.    Histories:  Allergies   Allergen Reactions   • Crestor [Rosuvastatin Calcium]    • Lipitor [Atorvastatin]    • Statins      Statins-Hmg-Coa Reductase Inhibitors   • Zetia [Ezetimibe]        Prior to Admission medications    Medication Sig Start Date End Date Taking? Authorizing Provider   erythromycin (ROMYCIN) 5 MG/GM ophthalmic ointment Apply a thin layer topically to affected area as directed to bolster 2 to 3 times daily to keep moist. 11/16/22  Yes Dione Shaffer MD   lisinopril (PRINIVIL,ZESTRIL) 20 MG tablet Take 1 tablet by mouth Daily. 7/13/22  Yes Wanda Mendoza MD       Past Medical History:   Diagnosis Date   • Chronic diarrhea    • Cystocele    • Degenerative joint disease involving multiple joints    •  Eczema    • Elevated cholesterol    • Essential hypertension    • Essential hypertension    • Fatigue    • Fibrocystic disease of both breasts    • First degree atrioventricular block    • Gastritis    • Gastroesophageal reflux disease    • H/O screening mammography    • Hyperlipidemia    • Hypertension    • Hypertension    • Hypotensive episode    • Irritable bowel    • Menopause    • Muscle weakness    • Nausea and vomiting    • Need for influenza vaccination    • Need for prophylactic vaccination against viral disease    • Need for prophylactic vaccination and inoculation against influenza    • Osteoarthritis of knee    • Sleep apnea    • Urinary tract infection    • Urinary tract infection    • Visit for gynecologic examination    • Vulvovaginitis    • Wears dentures    • Wears glasses        Past Surgical History:   Procedure Laterality Date   • BREAST BIOPSY Bilateral     1 Right, 2 Left all benign   • BREAST LUMPECTOMY     • CHOLECYSTECTOMY  2014   • HYSTERECTOMY  1976    partial wo bso   • INCONTINENCE SURGERY  1976   • SKIN LESION EXCISION N/A 11/16/2022    Procedure: SKIN LESION EXCISION  NOSE WITH FROZEN SECTIONS AND FULL THICKNESS SKIN GRAFT FROM NECK;  Surgeon: Dione Shaffer MD;  Location: [facility];  Service: ENT;  Laterality: N/A;       Social History     Socioeconomic History   • Marital status:    Tobacco Use   • Smoking status: Never   • Smokeless tobacco: Never   Vaping Use   • Vaping Use: Never used   Substance and Sexual Activity   • Alcohol use: No   • Drug use: No   • Sexual activity: Never     Comment:        Family History   Problem Relation Age of Onset   • Hypertension Mother    • Other Mother         Thyroid disorder   • Cancer Father         Prostate Cancer   • Hypertension Sister    • Hypertension Brother    • Hypertension Brother    • Liver cancer Maternal Grandmother          This document has been electronically signed by Dione Shaffer MD on April 5, 2023 11:19 CDT

## 2023-07-22 DIAGNOSIS — I10 ESSENTIAL HYPERTENSION: ICD-10-CM

## 2023-07-24 RX ORDER — LISINOPRIL 20 MG/1
TABLET ORAL
Qty: 30 TABLET | Refills: 0 | Status: SHIPPED | OUTPATIENT
Start: 2023-07-24

## 2023-08-08 ENCOUNTER — TELEPHONE (OUTPATIENT)
Dept: FAMILY MEDICINE CLINIC | Facility: CLINIC | Age: 78
End: 2023-08-08
Payer: COMMERCIAL

## 2023-08-08 DIAGNOSIS — E78.5 HYPERLIPIDEMIA, UNSPECIFIED HYPERLIPIDEMIA TYPE: ICD-10-CM

## 2023-08-08 DIAGNOSIS — Z13.29 SCREENING FOR THYROID DISORDER: ICD-10-CM

## 2023-08-08 DIAGNOSIS — Z13.1 SCREENING FOR DIABETES MELLITUS: ICD-10-CM

## 2023-08-08 DIAGNOSIS — I10 ESSENTIAL HYPERTENSION: Primary | ICD-10-CM

## 2023-08-15 ENCOUNTER — LAB (OUTPATIENT)
Dept: LAB | Facility: OTHER | Age: 78
End: 2023-08-15
Payer: COMMERCIAL

## 2023-08-15 DIAGNOSIS — Z13.29 SCREENING FOR THYROID DISORDER: ICD-10-CM

## 2023-08-15 DIAGNOSIS — E78.5 HYPERLIPIDEMIA, UNSPECIFIED HYPERLIPIDEMIA TYPE: ICD-10-CM

## 2023-08-15 DIAGNOSIS — Z13.1 SCREENING FOR DIABETES MELLITUS: ICD-10-CM

## 2023-08-15 DIAGNOSIS — I10 ESSENTIAL HYPERTENSION: ICD-10-CM

## 2023-08-15 LAB
ALBUMIN SERPL-MCNC: 4 G/DL (ref 3.5–5)
ALBUMIN/GLOB SERPL: 1.1 G/DL (ref 1.1–1.8)
ALP SERPL-CCNC: 59 U/L (ref 38–126)
ALT SERPL W P-5'-P-CCNC: 26 U/L
ANION GAP SERPL CALCULATED.3IONS-SCNC: 7 MMOL/L (ref 5–15)
AST SERPL-CCNC: 27 U/L (ref 14–36)
BASOPHILS # BLD AUTO: 0.04 10*3/MM3 (ref 0–0.2)
BASOPHILS NFR BLD AUTO: 0.5 % (ref 0–1.5)
BILIRUB SERPL-MCNC: 0.7 MG/DL (ref 0–1.2)
BUN SERPL-MCNC: 10 MG/DL (ref 7–23)
BUN/CREAT SERPL: 13 (ref 7–25)
CALCIUM SPEC-SCNC: 9.3 MG/DL (ref 8.4–10.2)
CHLORIDE SERPL-SCNC: 103 MMOL/L (ref 101–112)
CHOLEST SERPL-MCNC: 196 MG/DL (ref 150–200)
CO2 SERPL-SCNC: 30 MMOL/L (ref 22–30)
CREAT SERPL-MCNC: 0.77 MG/DL (ref 0.52–1.04)
DEPRECATED RDW RBC AUTO: 45.5 FL (ref 37–54)
EGFRCR SERPLBLD CKD-EPI 2021: 80.1 ML/MIN/1.73
EOSINOPHIL # BLD AUTO: 0.22 10*3/MM3 (ref 0–0.4)
EOSINOPHIL NFR BLD AUTO: 2.8 % (ref 0.3–6.2)
ERYTHROCYTE [DISTWIDTH] IN BLOOD BY AUTOMATED COUNT: 13.8 % (ref 12.3–15.4)
GLOBULIN UR ELPH-MCNC: 3.5 GM/DL (ref 2.3–3.5)
GLUCOSE SERPL-MCNC: 95 MG/DL (ref 70–99)
HBA1C MFR BLD: 5.8 % (ref 4.8–5.6)
HCT VFR BLD AUTO: 41.4 % (ref 34–46.6)
HDLC SERPL-MCNC: 37 MG/DL (ref 40–59)
HGB BLD-MCNC: 13.8 G/DL (ref 12–15.9)
LDLC SERPL CALC-MCNC: 114 MG/DL
LDLC/HDLC SERPL: 2.9 {RATIO} (ref 0–3.22)
LYMPHOCYTES # BLD AUTO: 3.7 10*3/MM3 (ref 0.7–3.1)
LYMPHOCYTES NFR BLD AUTO: 47.1 % (ref 19.6–45.3)
MCH RBC QN AUTO: 30.5 PG (ref 26.6–33)
MCHC RBC AUTO-ENTMCNC: 33.3 G/DL (ref 31.5–35.7)
MCV RBC AUTO: 91.6 FL (ref 79–97)
MONOCYTES # BLD AUTO: 0.58 10*3/MM3 (ref 0.1–0.9)
MONOCYTES NFR BLD AUTO: 7.4 % (ref 5–12)
NEUTROPHILS NFR BLD AUTO: 3.31 10*3/MM3 (ref 1.7–7)
NEUTROPHILS NFR BLD AUTO: 42.2 % (ref 42.7–76)
PLATELET # BLD AUTO: 228 10*3/MM3 (ref 140–450)
PMV BLD AUTO: 10.5 FL (ref 6–12)
POTASSIUM SERPL-SCNC: 4.2 MMOL/L (ref 3.4–5)
PROT SERPL-MCNC: 7.5 G/DL (ref 6.3–8.6)
RBC # BLD AUTO: 4.52 10*6/MM3 (ref 3.77–5.28)
SODIUM SERPL-SCNC: 140 MMOL/L (ref 137–145)
T3FREE SERPL-MCNC: 3.31 PG/ML (ref 2–4.4)
T4 FREE SERPL-MCNC: 1.11 NG/DL (ref 0.93–1.7)
TRIGL SERPL-MCNC: 259 MG/DL
TSH SERPL DL<=0.05 MIU/L-ACNC: 2.23 UIU/ML (ref 0.27–4.2)
VLDLC SERPL-MCNC: 45 MG/DL (ref 5–40)
WBC NRBC COR # BLD: 7.85 10*3/MM3 (ref 3.4–10.8)

## 2023-08-15 PROCEDURE — 84439 ASSAY OF FREE THYROXINE: CPT | Performed by: FAMILY MEDICINE

## 2023-08-15 PROCEDURE — 84481 FREE ASSAY (FT-3): CPT | Performed by: FAMILY MEDICINE

## 2023-08-15 PROCEDURE — 80061 LIPID PANEL: CPT | Performed by: FAMILY MEDICINE

## 2023-08-15 PROCEDURE — 84443 ASSAY THYROID STIM HORMONE: CPT | Performed by: FAMILY MEDICINE

## 2023-08-15 PROCEDURE — 83036 HEMOGLOBIN GLYCOSYLATED A1C: CPT | Performed by: FAMILY MEDICINE

## 2023-08-15 PROCEDURE — 85025 COMPLETE CBC W/AUTO DIFF WBC: CPT | Performed by: FAMILY MEDICINE

## 2023-08-15 PROCEDURE — 80053 COMPREHEN METABOLIC PANEL: CPT | Performed by: FAMILY MEDICINE

## 2023-08-15 PROCEDURE — 36415 COLL VENOUS BLD VENIPUNCTURE: CPT

## 2023-08-21 ENCOUNTER — OFFICE VISIT (OUTPATIENT)
Dept: FAMILY MEDICINE CLINIC | Facility: CLINIC | Age: 78
End: 2023-08-21
Payer: COMMERCIAL

## 2023-08-21 VITALS
HEART RATE: 56 BPM | BODY MASS INDEX: 34.55 KG/M2 | TEMPERATURE: 98.4 F | WEIGHT: 176 LBS | HEIGHT: 60 IN | SYSTOLIC BLOOD PRESSURE: 142 MMHG | OXYGEN SATURATION: 96 % | DIASTOLIC BLOOD PRESSURE: 82 MMHG

## 2023-08-21 DIAGNOSIS — Z00.00 ENCOUNTER FOR SUBSEQUENT ANNUAL WELLNESS VISIT (AWV) IN MEDICARE PATIENT: Primary | ICD-10-CM

## 2023-08-21 DIAGNOSIS — I10 ESSENTIAL HYPERTENSION: ICD-10-CM

## 2023-08-21 DIAGNOSIS — N95.2 ATROPHIC VAGINITIS: ICD-10-CM

## 2023-08-21 PROCEDURE — G0439 PPPS, SUBSEQ VISIT: HCPCS | Performed by: FAMILY MEDICINE

## 2023-08-21 RX ORDER — TRIAMCINOLONE ACETONIDE 0.25 MG/G
1 CREAM TOPICAL 2 TIMES DAILY
Qty: 80 G | Refills: 5 | Status: SHIPPED | OUTPATIENT
Start: 2023-08-21

## 2023-08-21 RX ORDER — LISINOPRIL 20 MG/1
20 TABLET ORAL DAILY
Qty: 90 TABLET | Refills: 3 | Status: SHIPPED | OUTPATIENT
Start: 2023-08-21

## 2023-08-21 RX ORDER — ERYTHROMYCIN 5 MG/G
OINTMENT OPHTHALMIC
Qty: 3.5 G | Refills: 11 | Status: SHIPPED | OUTPATIENT
Start: 2023-08-21

## 2023-08-21 NOTE — PROGRESS NOTES
The ABCs of the Annual Wellness Visit  Subsequent Medicare Wellness Visit    Subjective    Berna Moon is a 77 y.o. female who presents for a Subsequent Medicare Wellness Visit.    The following portions of the patient's history were reviewed and   updated as appropriate: allergies, current medications, past family history, past medical history, past social history, past surgical history, and problem list.    Compared to one year ago, the patient feels her physical   health is the same.    Compared to one year ago, the patient feels her mental   health is better.    Recent Hospitalizations:  She was admitted within the past 365 days at [facility].       Current Medical Providers:  Patient Care Team:  Wanda Mendoza MD as PCP - General (Family Medicine)    Outpatient Medications Prior to Visit   Medication Sig Dispense Refill    erythromycin (ROMYCIN) 5 MG/GM ophthalmic ointment Apply a thin layer topically to affected area as directed to bolster 2 to 3 times daily to keep moist. 3.5 g 11    lisinopril (PRINIVIL,ZESTRIL) 20 MG tablet TAKE 1 TABLET DAILY 30 tablet 0     No facility-administered medications prior to visit.       No opioid medication identified on active medication list. I have reviewed chart for other potential  high risk medication/s and harmful drug interactions in the elderly.        Aspirin is not on active medication list.  Aspirin use is not indicated based on review of current medical condition/s. Risk of harm outweighs potential benefits.  .    Patient Active Problem List   Diagnosis    Vulvovaginitis    Visit for gynecologic examination    Urinary tract infection    Sleep apnea    Osteoarthritis of knee    Nausea and vomiting    Muscle weakness    Menopause    Irritable bowel    Hypotensive episode    Hypertension    Hyperlipidemia    Gastroesophageal reflux disease    Gastritis    First degree atrioventricular block    Fibrocystic disease of both breasts    Fatigue    Essential  "hypertension    Degenerative joint disease involving multiple joints    Cystocele    Chronic diarrhea    Hypertriglyceridemia    Obesity (BMI 30-39.9)    Morbidly obese    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  ACP discussion was held with the patient during this visit. Patient has an advance directive (not in EMR), copy requested.     Objective    Vitals:    23 1236   BP: 142/82   Pulse: 56   Temp: 98.4 øF (36.9 øC)   SpO2: 96%   Weight: 79.8 kg (176 lb)   Height: 152.4 cm (60\")   PainSc:   4   PainLoc: Generalized     Estimated body mass index is 34.37 kg/mý as calculated from the following:    Height as of this encounter: 152.4 cm (60\").    Weight as of this encounter: 79.8 kg (176 lb).    BMI is >= 30 and <35. (Class 1 Obesity). The following options were offered after discussion;: exercise counseling/recommendations and nutrition counseling/recommendations      Does the patient have evidence of cognitive impairment? No    Lab Results   Component Value Date    TRIG 259 (H) 08/15/2023    HDL 37 (L) 08/15/2023     (H) 08/15/2023    VLDL 45 (H) 08/15/2023    HGBA1C 5.80 (H) 08/15/2023        HEALTH RISK ASSESSMENT    Smoking Status:  Social History     Tobacco Use   Smoking Status Never   Smokeless Tobacco Never     Alcohol Consumption:  Social History     Substance and Sexual Activity   Alcohol Use No     Fall Risk Screen:    STEADI Fall Risk Assessment was completed, and patient is at LOW risk for falls.Assessment completed on:2023    Depression Screenin/21/2023    12:55 PM   PHQ-2/PHQ-9 Depression Screening   Little Interest or Pleasure in Doing Things 0-->not at all   Feeling Down, Depressed or Hopeless 0-->not at all   PHQ-9: Brief Depression Severity Measure Score 0       Health Habits and Functional and Cognitive Screenin/21/2023    12:54 PM   Functional & Cognitive Status   Do you " have difficulty preparing food and eating? No   Do you have difficulty bathing yourself, getting dressed or grooming yourself? No   Do you have difficulty using the toilet? No   Do you have difficulty moving around from place to place? No   Do you have trouble with steps or getting out of a bed or a chair? No   Current Diet Well Balanced Diet   Dental Exam Up to date   Eye Exam Up to date   Exercise (times per week) 7 times per week   Current Exercises Include Walking   Do you need help using the phone?  No   Are you deaf or do you have serious difficulty hearing?  No   Do you need help to go to places out of walking distance? No   Do you need help shopping? No   Do you need help preparing meals?  No   Do you need help with housework?  No   Do you need help with laundry? No   Do you need help taking your medications? No   Do you need help managing money? No   Do you ever drive or ride in a car without wearing a seat belt? No   Have you felt unusual stress, anger or loneliness in the last month? No   Who do you live with? Spouse   If you need help, do you have trouble finding someone available to you? No   Have you been bothered in the last four weeks by sexual problems? No   Do you have difficulty concentrating, remembering or making decisions? No       Age-appropriate Screening Schedule:  Refer to the list below for future screening recommendations based on patient's age, sex and/or medical conditions. Orders for these recommended tests are listed in the plan section. The patient has been provided with a written plan.    Health Maintenance   Topic Date Due    TDAP/TD VACCINES (1 - Tdap) Never done    ZOSTER VACCINE (1 of 2) Never done    Pneumococcal Vaccine 65+ (2 - PPSV23 or PCV20) 09/26/2017    ANNUAL WELLNESS VISIT  07/20/2022    COVID-19 Vaccine (6 - Moderna series) 02/17/2023    INFLUENZA VACCINE  10/01/2023    DXA SCAN  08/10/2024    LIPID PANEL  08/15/2024    COLORECTAL CANCER SCREENING  02/01/2026     "HEPATITIS C SCREENING  Addressed                  CMS Preventative Services Quick Reference  Risk Factors Identified During Encounter  Fall Risk-High or Moderate: Information on Fall Prevention Shared in After Visit Summary  The above risks/problems have been discussed with the patient.  Pertinent information has been shared with the patient in the After Visit Summary.  An After Visit Summary and PPPS were made available to the patient.    Follow Up:   Next Medicare Wellness visit to be scheduled in 1 year.       Additional E&M Note during same encounter follows:  Patient has multiple medical problems which are significant and separately identifiable that require additional work above and beyond the Medicare Wellness Visit.      Chief Complaint  Annual Exam and Medicare Wellness-subsequent (Having some problems in her vagina. Her skin feels like its peeling, it will bleed every now and then. Wonder if its a yeast infection)    Subjective        HPI  Berna Moon is also being seen today for concern about atrophic vaginitis, foot pain.    She has had longstanding issues with vaginal pain, dryness, and burning.  She has been using over-the-counter topical medications.    She turned her left foot a few weeks ago.  Is still having some swelling around the ankle.  She is not having significant pain with weightbearing.  Review of Systems   Constitutional: Negative.    HENT: Negative.     Eyes: Negative.    Respiratory: Negative.     Cardiovascular: Negative.    Endocrine: Negative.    Genitourinary:  Positive for vaginal pain. Negative for vaginal bleeding.   Musculoskeletal: Negative.    Neurological: Negative.    Hematological: Negative.    All other systems reviewed and are negative.    Objective   Vital Signs:  /82   Pulse 56   Temp 98.4 øF (36.9 øC)   Ht 152.4 cm (60\")   Wt 79.8 kg (176 lb)   SpO2 96%   BMI 34.37 kg/mý     Physical Exam  Vitals and nursing note reviewed.   Constitutional:       " Appearance: She is well-developed. She is obese.   HENT:      Head: Normocephalic and atraumatic.      Nose: Nose normal.      Comments: Scar noted on the right nasal bridge area from previous cancer surgery  Eyes:      Conjunctiva/sclera: Conjunctivae normal.      Pupils: Pupils are equal, round, and reactive to light.   Neck:      Thyroid: No thyromegaly.      Vascular: No JVD.      Trachea: No tracheal deviation.   Cardiovascular:      Rate and Rhythm: Normal rate and regular rhythm.      Heart sounds: Normal heart sounds. No murmur heard.  Pulmonary:      Effort: Pulmonary effort is normal.      Breath sounds: Normal breath sounds. No wheezing.   Abdominal:      General: Bowel sounds are normal. There is no distension.      Palpations: Abdomen is soft.      Tenderness: There is no abdominal tenderness.   Genitourinary:     Comments: Patient has significant atrophy with lichenification in the vaginal introitus  Musculoskeletal:         General: Normal range of motion.      Cervical back: Normal range of motion and neck supple.      Comments: Mild swelling over left lateral foot.   Lymphadenopathy:      Cervical: No cervical adenopathy.   Skin:     General: Skin is warm and dry.      Findings: No rash.   Neurological:      Mental Status: She is alert and oriented to person, place, and time.      Coordination: Coordination normal.   Psychiatric:         Mood and Affect: Mood normal.         Behavior: Behavior normal.         Thought Content: Thought content normal.         Judgment: Judgment normal.        The following data was reviewed by: Wanda Mendoza MD on 08/21/2023:  Common labs          8/15/2023    09:13   Common Labs   Glucose 95    BUN 10    Creatinine 0.77    Sodium 140    Potassium 4.2    Chloride 103    Calcium 9.3    Albumin 4.0    Total Bilirubin 0.7    Alkaline Phosphatase 59    AST (SGOT) 27    ALT (SGPT) 26    WBC 7.85    Hemoglobin 13.8    Hematocrit 41.4    Platelets 228    Total Cholesterol  196    Triglycerides 259    HDL Cholesterol 37    LDL Cholesterol  114    Hemoglobin A1C 5.80                 Assessment and Plan   Diagnoses and all orders for this visit:    1. Encounter for subsequent annual wellness visit (AWV) in Medicare patient (Primary)    2. Essential hypertension  -     lisinopril (PRINIVIL,ZESTRIL) 20 MG tablet; Take 1 tablet by mouth Daily.  Dispense: 90 tablet; Refill: 3    3. Atrophic vaginitis    Other orders  -     erythromycin (ROMYCIN) 5 MG/GM ophthalmic ointment; Apply a thin layer topically to affected area as directed to bolster 2 to 3 times daily to keep moist.  Dispense: 3.5 g; Refill: 11  -     triamcinolone (KENALOG) 0.025 % cream; Apply 1 application  topically to the appropriate area as directed 2 (Two) Times a Day.  Dispense: 80 g; Refill: 5    Continue current medications for hypertension and continue to monitor blood pressures regularly with goal of 130/80 or less    She has Premarin cream at home that she will use and if not improving she will let me know may need to consider other options.  Also gave triamcinolone cream that she may use externally on occasion for the irritation    Refilled erythromycin, as above       I spent 30 minutes caring for Berna on this date of service. This time includes time spent by me in the following activities:preparing for the visit, reviewing tests, obtaining and/or reviewing a separately obtained history, performing a medically appropriate examination and/or evaluation , counseling and educating the patient/family/caregiver, ordering medications, tests, or procedures, and documenting information in the medical record  Follow Up   No follow-ups on file.  Patient was given instructions and counseling regarding her condition or for health maintenance advice. Please see specific information pulled into the AVS if appropriate.           This document has been electronically signed by Wanda Mendoza MD on August 21, 2023 18:12 CDT

## 2025-04-03 NOTE — INTERVAL H&P NOTE
H&P reviewed. The patient was examined and there are no changes to the H&P.      Vitals:    11/16/22 0708   BP: 139/70   Pulse: 73   Resp: 18   Temp: 96.8 °F (36 °C)   SpO2: 94%         Dione Shaffer MD  11/16/2022 07:41 CST.    
Assistance with ambulation/Assistance OOB with selected safe patient handling equipment/Communicate Risk of Fall with Harm to all staff/Discuss with provider need for PT consult/Monitor gait and stability/Reinforce activity limits and safety measures with patient and family/Sit up slowly, dangle for a short time, stand at bedside before walking/Tailored Fall Risk Interventions/Visual Cue: Yellow wristband and red socks/Bed in lowest position, wheels locked, appropriate side rails in place/Call bell, personal items and telephone in reach/Instruct patient to call for assistance before getting out of bed or chair/Non-slip footwear when patient is out of bed/[place] to call system/Physically safe environment - no spills, clutter or unnecessary equipment/Purposeful Proactive Rounding/Room/bathroom lighting operational, light cord in reach

## (undated) DEVICE — SUT ETHLN 3-0 FS118IN 663H

## (undated) DEVICE — SUT MNCRYL PLS ANTIB UD 4/0 PC3 18IN

## (undated) DEVICE — SYR LL TP 10ML STRL

## (undated) DEVICE — MARKR SKIN W/RULR AND LBL

## (undated) DEVICE — PATIENT RETURN ELECTRODE, SINGLE-USE, CONTACT QUALITY MONITORING, ADULT, WITH 9FT CORD, FOR PATIENTS WEIGING OVER 33LBS. (15KG): Brand: MEGADYNE

## (undated) DEVICE — PREP PVP-I 7.5P BT 4OZ

## (undated) DEVICE — GLV SURG SENSICARE PI PF LF 7 GRN STRL

## (undated) DEVICE — INTENDED FOR TISSUE SEPARATION, AND OTHER PROCEDURES THAT REQUIRE A SHARP SURGICAL BLADE TO PUNCTURE OR CUT.: Brand: BARD-PARKER ® CARBON RIB-BACK BLADES

## (undated) DEVICE — STERILE POLYISOPRENE POWDER-FREE SURGICAL GLOVES WITH EMOLLIENT COATING: Brand: PROTEXIS

## (undated) DEVICE — GLV SURG SENSICARE PI LF PF 7.5 GRN STRL

## (undated) DEVICE — GAUZE,SPONGE,4"X4",16PLY,XRAY,STRL,LF: Brand: MEDLINE

## (undated) DEVICE — CONTAINER,SPECIMEN,OR STERILE,4OZ: Brand: MEDLINE

## (undated) DEVICE — PENCL ES MEGADINE EZ/CLEAN BUTN W/HOLSTR 10FT

## (undated) DEVICE — SOL IRR NACL 0.9PCT BT 1000ML

## (undated) DEVICE — Device

## (undated) DEVICE — GLV SURG SIGNATURE ESSENTIAL PF LTX SZ6.5

## (undated) DEVICE — DRSNG GZ CURAD XEROFORM NONADHS 5X9IN STRL

## (undated) DEVICE — PK ENT LF 60

## (undated) DEVICE — PENCL E/S HNDSWCH PUSHBTN HOLSTR 10FT

## (undated) DEVICE — SUT PDS 5/0 RB1 27IN Z303H

## (undated) DEVICE — SUT GUT PLN FAST ABS 5/0 PC1 18IN 1915G

## (undated) DEVICE — ELECTRD NDL EZ CLN MOD 2.75IN

## (undated) DEVICE — NDL HYPO ECLPS SFTY 25G 1 1/2IN

## (undated) DEVICE — SUT SILK 2/0 FS BLK 18IN 685G

## (undated) DEVICE — BLD TONG INDIV/WRP A/ 6IN STRL